# Patient Record
Sex: MALE | Race: WHITE | NOT HISPANIC OR LATINO | Employment: FULL TIME | URBAN - METROPOLITAN AREA
[De-identification: names, ages, dates, MRNs, and addresses within clinical notes are randomized per-mention and may not be internally consistent; named-entity substitution may affect disease eponyms.]

---

## 2023-02-07 PROBLEM — H91.93 HEARING LOSS, BILATERAL: Status: ACTIVE | Noted: 2023-02-07

## 2023-02-07 PROBLEM — D64.9 ANEMIA: Status: ACTIVE | Noted: 2023-02-07

## 2023-02-07 PROBLEM — M79.662 PAIN OF LEFT CALF: Status: ACTIVE | Noted: 2023-01-17

## 2023-02-07 PROBLEM — R07.89 CHEST HEAVINESS: Status: ACTIVE | Noted: 2023-02-07

## 2023-02-07 PROBLEM — R20.2 PARESTHESIA: Status: ACTIVE | Noted: 2023-02-07

## 2023-02-07 PROBLEM — H61.20 CERUMEN IMPACTION: Status: ACTIVE | Noted: 2023-02-07

## 2023-02-07 PROBLEM — R35.1 NOCTURIA: Status: ACTIVE | Noted: 2023-02-07

## 2023-02-07 PROBLEM — R97.20 ELEVATED PSA: Status: ACTIVE | Noted: 2023-02-07

## 2023-02-07 PROBLEM — R73.9 BORDERLINE HYPERGLYCEMIA: Status: ACTIVE | Noted: 2023-02-07

## 2023-02-07 PROBLEM — R31.9 HEMATURIA: Status: ACTIVE | Noted: 2023-02-07

## 2023-02-07 PROBLEM — E78.5 HYPERLIPIDEMIA: Status: ACTIVE | Noted: 2023-02-07

## 2023-02-07 PROBLEM — M51.34 BULGE OF THORACIC DISC WITHOUT MYELOPATHY: Status: ACTIVE | Noted: 2023-02-07

## 2023-02-07 PROBLEM — H60.91 RIGHT OTITIS EXTERNA: Status: ACTIVE | Noted: 2023-02-07

## 2023-02-07 PROBLEM — M54.9 BACK PAIN: Status: ACTIVE | Noted: 2023-02-07

## 2023-02-07 PROBLEM — M25.569 KNEE PAIN: Status: ACTIVE | Noted: 2023-01-18

## 2023-02-07 PROBLEM — R10.9 LEFT FLANK PAIN: Status: ACTIVE | Noted: 2023-02-07

## 2023-02-07 RX ORDER — NAPROXEN 500 MG/1
500 TABLET ORAL 2 TIMES DAILY
COMMUNITY
End: 2023-08-21 | Stop reason: ALTCHOICE

## 2023-02-16 PROBLEM — J20.9 ACUTE BRONCHITIS: Status: ACTIVE | Noted: 2023-02-16

## 2023-03-21 ENCOUNTER — TELEMEDICINE (OUTPATIENT)
Dept: PRIMARY CARE | Facility: CLINIC | Age: 73
End: 2023-03-21
Payer: MEDICARE

## 2023-03-21 DIAGNOSIS — H10.33 ACUTE CONJUNCTIVITIS OF BOTH EYES, UNSPECIFIED ACUTE CONJUNCTIVITIS TYPE: ICD-10-CM

## 2023-03-21 DIAGNOSIS — B34.2 CORONAVIRUS INFECTION: Primary | ICD-10-CM

## 2023-03-21 PROCEDURE — 99213 OFFICE O/P EST LOW 20 MIN: CPT | Performed by: FAMILY MEDICINE

## 2023-03-21 RX ORDER — TOBRAMYCIN 3 MG/ML
2 SOLUTION/ DROPS OPHTHALMIC EVERY 4 HOURS
Qty: 5 ML | Refills: 0 | Status: SHIPPED | OUTPATIENT
Start: 2023-03-21 | End: 2023-03-29

## 2023-03-21 RX ORDER — TOBRAMYCIN 3 MG/ML
2 SOLUTION/ DROPS OPHTHALMIC EVERY 4 HOURS
Qty: 8.4 ML | Refills: 0 | Status: SHIPPED | OUTPATIENT
Start: 2023-03-21 | End: 2023-03-21 | Stop reason: SDUPTHER

## 2023-03-21 NOTE — PROGRESS NOTES
Subjective   Patient ID: [id number]   Virtual or Telephone Consent    An interactive audio and video telecommunication system which permits real time communications between the patient (at the originating site) and provider (at the distant site) was utilized to provide this telehealth service.   Verbal consent was requested and obtained from Mal Lopez on this date, 03/21/23 for a telehealth visit.     Mal Lopez is a 72 y.o. male who presents for being covid positive and developing possible pink eye.  HPI  He tested positive for covid last night.  He first started feeling sick one to two weeks ago with what he thought was a sinus infection.  He has cough, nasal congestion and sinus drainage.      He has watering and irritation of the left eye.  Has a swollen conjunctiva in the left eye that spread to both eyes.  Both eyes were crusted shut this morning.    Objective     There were no vitals taken for this visit.     Physical Exam  Constitutional:       General: He is not in acute distress.     Appearance: He is not ill-appearing or toxic-appearing.   Eyes:      Comments: Swelling at both conjunctivae noted.  Tearing.   Pulmonary:      Effort: Pulmonary effort is normal. No respiratory distress.   Neurological:      Mental Status: He is alert.         Assessment/Plan   Problem List Items Addressed This Visit    None  Visit Diagnoses       Coronavirus infection    -  Primary    Acute conjunctivitis of both eyes, unspecified acute conjunctivitis type        Relevant Medications    tobramycin (Tobrex) 0.3 % ophthalmic solution        I recommend that you quarantine for 5 days and until you are feeling better.  I will call in tobramycin for your eyes.  I recommend mucinex for congestion and tylenol if you have a fever.  If you have significant shortness of breath, go to the ER.      Bhupendra Yee, DO

## 2023-03-21 NOTE — PATIENT INSTRUCTIONS
I recommend that you quarantine for 5 days and until you are feeling better.  I will call in tobramycin for your eyes.  I recommend mucinex for congestion and tylenol if you have a fever.  If you have significant shortness of breath, go to the ER.

## 2023-04-25 ENCOUNTER — APPOINTMENT (OUTPATIENT)
Dept: PRIMARY CARE | Facility: CLINIC | Age: 73
End: 2023-04-25
Payer: MEDICARE

## 2023-08-18 ENCOUNTER — OFFICE VISIT (OUTPATIENT)
Dept: PRIMARY CARE | Facility: CLINIC | Age: 73
End: 2023-08-18
Payer: MEDICARE

## 2023-08-18 VITALS
SYSTOLIC BLOOD PRESSURE: 112 MMHG | WEIGHT: 206 LBS | DIASTOLIC BLOOD PRESSURE: 70 MMHG | TEMPERATURE: 97.9 F | BODY MASS INDEX: 30.42 KG/M2

## 2023-08-18 DIAGNOSIS — M25.562 CHRONIC PAIN OF LEFT KNEE: ICD-10-CM

## 2023-08-18 DIAGNOSIS — M25.512 CHRONIC LEFT SHOULDER PAIN: Primary | ICD-10-CM

## 2023-08-18 DIAGNOSIS — G89.29 CHRONIC LEFT SHOULDER PAIN: Primary | ICD-10-CM

## 2023-08-18 DIAGNOSIS — G89.29 CHRONIC PAIN OF LEFT KNEE: ICD-10-CM

## 2023-08-18 DIAGNOSIS — H61.21 IMPACTED CERUMEN OF RIGHT EAR: ICD-10-CM

## 2023-08-18 PROCEDURE — 1036F TOBACCO NON-USER: CPT | Performed by: FAMILY MEDICINE

## 2023-08-18 PROCEDURE — 1159F MED LIST DOCD IN RCRD: CPT | Performed by: FAMILY MEDICINE

## 2023-08-18 PROCEDURE — 1160F RVW MEDS BY RX/DR IN RCRD: CPT | Performed by: FAMILY MEDICINE

## 2023-08-18 PROCEDURE — 99214 OFFICE O/P EST MOD 30 MIN: CPT | Performed by: FAMILY MEDICINE

## 2023-08-18 ASSESSMENT — PATIENT HEALTH QUESTIONNAIRE - PHQ9
1. LITTLE INTEREST OR PLEASURE IN DOING THINGS: NOT AT ALL
2. FEELING DOWN, DEPRESSED OR HOPELESS: NOT AT ALL
SUM OF ALL RESPONSES TO PHQ9 QUESTIONS 1 AND 2: 0

## 2023-08-18 ASSESSMENT — ENCOUNTER SYMPTOMS: DEPRESSION: 0

## 2023-08-18 NOTE — PROGRESS NOTES
Subjective   Patient ID: [id number]     Mal Lopez is a 73 y.o. male who presents for Shoulder Pain (Left), Knee Pain (Left), and Earache (Right).  HPI  He complains of left shoulder pain.  His left shoulder started hurting about five months ago.  It radiates down the left arm.  He has not noticed any neck pain.    He complains of left knee pain.  He was ordered PT when seen in February but he does not remember this and did not get PT.      The left knee pain has worsened over the last six months.    Neither of these pains started with any type of injury.    He takes an antiinflammatory medication and does not think that it helps.      He complains of right ear pain.  This has been going on for about a year, maybe less.  Wears hearing aids and he thinks that is a factor.  He states the hearing aids continue to help with his hearing.         Objective     /70 (BP Location: Left arm, Patient Position: Sitting)   Temp 36.6 °C (97.9 °F)   Wt 93.4 kg (206 lb)   BMI 30.42 kg/m²      Physical Exam  Constitutional:       Appearance: Normal appearance.   HENT:      Ears:      Comments: Hearing aids removed.  Right EAC completely blocked with light tan/yellow substance, possibly wax.        Musculoskeletal:      Comments: Bony hypertrophy left knee.  Mild effusion.  Ligs intact. DJD noted.      Left should tender with planes of ROM.  Abduction limited to 125 degrees and uses flexion to help.  Tender at subacromial space.    Neck nontender.  CROM normal.  Spurling's sign neg.   Neurological:      Mental Status: He is alert.       After irrigation of right ear--wax removed. EAC normal.  TM clear.  Assessment/Plan   Problem List Items Addressed This Visit       Cerumen impaction    Knee pain - Primary    Relevant Orders    XR shoulder left 2+ views    XR knee left 3 views    Referral to Physical Therapy     Other Visit Diagnoses       Chronic left shoulder pain        Relevant Orders    XR shoulder left 2+ views     XR knee left 3 views    Referral to Physical Therapy            Bhupendra Yee DO

## 2023-08-18 NOTE — PATIENT INSTRUCTIONS
I will order PT and xrays of the left knee and left shoulder.  Return in one month if the knee or shoulder still hurt. Your ear looks a lot better now.  Ear wax removal drops may be helpful.  Do not get water in the ears.  Do not use q tips.

## 2023-08-21 ENCOUNTER — OFFICE VISIT (OUTPATIENT)
Dept: PRIMARY CARE | Facility: CLINIC | Age: 73
End: 2023-08-21
Payer: MEDICARE

## 2023-08-21 VITALS
SYSTOLIC BLOOD PRESSURE: 122 MMHG | DIASTOLIC BLOOD PRESSURE: 74 MMHG | BODY MASS INDEX: 30.42 KG/M2 | TEMPERATURE: 98.4 F | WEIGHT: 206 LBS

## 2023-08-21 DIAGNOSIS — M25.511 ACUTE PAIN OF RIGHT SHOULDER: Primary | ICD-10-CM

## 2023-08-21 PROCEDURE — 1159F MED LIST DOCD IN RCRD: CPT | Performed by: FAMILY MEDICINE

## 2023-08-21 PROCEDURE — 99213 OFFICE O/P EST LOW 20 MIN: CPT | Performed by: FAMILY MEDICINE

## 2023-08-21 PROCEDURE — 1036F TOBACCO NON-USER: CPT | Performed by: FAMILY MEDICINE

## 2023-08-21 PROCEDURE — 1160F RVW MEDS BY RX/DR IN RCRD: CPT | Performed by: FAMILY MEDICINE

## 2023-08-21 RX ORDER — NAPROXEN 500 MG/1
500 TABLET ORAL 2 TIMES DAILY
Qty: 60 TABLET | Refills: 0 | Status: SHIPPED
Start: 2023-08-21 | End: 2023-09-28

## 2023-08-21 RX ORDER — CELECOXIB 200 MG/1
200 CAPSULE ORAL 2 TIMES DAILY
COMMUNITY
End: 2023-08-21 | Stop reason: ALTCHOICE

## 2023-08-21 ASSESSMENT — PATIENT HEALTH QUESTIONNAIRE - PHQ9
SUM OF ALL RESPONSES TO PHQ9 QUESTIONS 1 AND 2: 0
2. FEELING DOWN, DEPRESSED OR HOPELESS: NOT AT ALL
1. LITTLE INTEREST OR PLEASURE IN DOING THINGS: NOT AT ALL

## 2023-08-21 ASSESSMENT — ENCOUNTER SYMPTOMS: DEPRESSION: 0

## 2023-08-21 NOTE — PATIENT INSTRUCTIONS
I will add on an xray of your right shoulder.  I will refer you to an orthopedic doctor regarding your right shoulder.

## 2023-08-21 NOTE — PROGRESS NOTES
Subjective   Patient ID: [id number]     Mal Lopez is a 73 y.o. male who presents for Shoulder Injury (Right shoulder.  Unable to raise arm.).  HPI  He complains of right shoulder pain and injury.  He is unable to raise his arm.      He was repairing a cistern in his older home in the basement.  He went down to get a tool that he dropped and he used his arms to push himself up.  His left arm slipped and he fell on his right shoulder.    It is still feeling bad.  He cannot raise the right shoulder.    He was seen Friday, complaining of left shoulder pain.  This is a new injury to the right shoulder.        Objective     /74 (BP Location: Left arm, Patient Position: Sitting)   Temp 36.9 °C (98.4 °F)   Wt 93.4 kg (206 lb)   BMI 30.42 kg/m²      Physical Exam  Constitutional:       Appearance: Normal appearance.   Musculoskeletal:      Comments: Right shoulder very little motion.  Abduction 20.  Flexion 20.  Tender at the subacromial space.  External rotation 30.         Neurological:      Mental Status: He is alert.         Assessment/Plan   Problem List Items Addressed This Visit    None  Visit Diagnoses       Acute pain of right shoulder    -  Primary    Relevant Medications    naproxen (Naprosyn) 500 mg tablet    Other Relevant Orders    XR shoulder right 2+ views    Referral to Orthopaedic Surgery        I will add on an xray of your right shoulder.  I will refer you to an orthopedic doctor regarding your right shoulder.      Bhupendra Yee,

## 2023-09-26 ENCOUNTER — TELEPHONE (OUTPATIENT)
Dept: PRIMARY CARE | Facility: CLINIC | Age: 73
End: 2023-09-26
Payer: MEDICARE

## 2023-09-26 NOTE — TELEPHONE ENCOUNTER
Bhupendra Yee, DO to [place] [id number] Clinical Support Staff    Please let him know his xrays of the shoulder and knee showed arthritis.  It is considered to be severe in the left knee.  No other significant abnormalities were seen.

## 2023-09-26 NOTE — TELEPHONE ENCOUNTER
Results discussed with patient.  He states that left knee is very swollen and painful.  The pain is keeping him up at night.  Patient would like to speak with you to discuss treatment options/recommendations to ease pain.  Please call [phone]

## 2023-09-28 DIAGNOSIS — M17.10 PRIMARY OSTEOARTHRITIS OF KNEE, UNSPECIFIED LATERALITY: Primary | ICD-10-CM

## 2023-09-28 RX ORDER — CELECOXIB 200 MG/1
200 CAPSULE ORAL DAILY
Qty: 30 CAPSULE | Refills: 0 | Status: SHIPPED | OUTPATIENT
Start: 2023-09-28 | End: 2024-03-26

## 2023-09-28 NOTE — TELEPHONE ENCOUNTER
Spoke with pt.  He has severe pain and swelling in the knee.    He has a rotator cuff tear.  He needs  a surgery for this.      A knee replacement may be needed.  Recommend ortho referral for the knee once recovered from the shoulder surgery.

## 2023-10-05 ENCOUNTER — OFFICE VISIT (OUTPATIENT)
Dept: PRIMARY CARE | Facility: CLINIC | Age: 73
End: 2023-10-05
Payer: MEDICARE

## 2023-10-05 VITALS
WEIGHT: 209 LBS | BODY MASS INDEX: 30.86 KG/M2 | DIASTOLIC BLOOD PRESSURE: 70 MMHG | SYSTOLIC BLOOD PRESSURE: 114 MMHG | TEMPERATURE: 97.3 F

## 2023-10-05 DIAGNOSIS — H65.191 ACUTE MUCOID OTITIS MEDIA OF RIGHT EAR: Primary | ICD-10-CM

## 2023-10-05 PROCEDURE — 1160F RVW MEDS BY RX/DR IN RCRD: CPT | Performed by: FAMILY MEDICINE

## 2023-10-05 PROCEDURE — 99213 OFFICE O/P EST LOW 20 MIN: CPT | Performed by: FAMILY MEDICINE

## 2023-10-05 PROCEDURE — 1036F TOBACCO NON-USER: CPT | Performed by: FAMILY MEDICINE

## 2023-10-05 PROCEDURE — 1159F MED LIST DOCD IN RCRD: CPT | Performed by: FAMILY MEDICINE

## 2023-10-05 RX ORDER — NEOMYCIN SULFATE, POLYMYXIN B SULFATE AND HYDROCORTISONE 10; 3.5; 1 MG/ML; MG/ML; [USP'U]/ML
3 SUSPENSION/ DROPS AURICULAR (OTIC) 4 TIMES DAILY
Qty: 10 ML | Refills: 0 | Status: SHIPPED | OUTPATIENT
Start: 2023-10-05 | End: 2023-10-22

## 2023-10-05 RX ORDER — DOXYCYCLINE 100 MG/1
100 CAPSULE ORAL 2 TIMES DAILY
Qty: 20 CAPSULE | Refills: 0 | Status: SHIPPED | OUTPATIENT
Start: 2023-10-05 | End: 2023-10-15

## 2023-10-05 ASSESSMENT — PATIENT HEALTH QUESTIONNAIRE - PHQ9
2. FEELING DOWN, DEPRESSED OR HOPELESS: NOT AT ALL
SUM OF ALL RESPONSES TO PHQ9 QUESTIONS 1 AND 2: 0
1. LITTLE INTEREST OR PLEASURE IN DOING THINGS: NOT AT ALL

## 2023-10-05 ASSESSMENT — ENCOUNTER SYMPTOMS: DEPRESSION: 0

## 2023-10-05 NOTE — PROGRESS NOTES
Subjective   Patient ID: [id number]     Mal Lopez is a 73 y.o. male who presents for ear blockage.  HPI  He complains of a blocked sensation in his right ear. This started a few days after his last appointment.    No headache.  Chronic neck pain.        Objective     /70 (BP Location: Left arm, Patient Position: Sitting)   Temp 36.3 °C (97.3 °F)   Wt 94.8 kg (209 lb)   BMI 30.86 kg/m²      Physical Exam  Constitutional:       Appearance: Normal appearance.   HENT:      Right Ear: There is no impacted cerumen.      Ears:      Comments: TM appears hazy/yellow/green.  EAC nontender.  Scant drainage. I do not see a wax impaction.    Neurological:      General: No focal deficit present.      Mental Status: He is alert.         Assessment/Plan   Problem List Items Addressed This Visit    None  Visit Diagnoses       Acute mucoid otitis media of right ear    -  Primary    Relevant Medications    doxycycline (Vibramycin) 100 mg capsule    neomycin-polymyxin-HC (Cortisporin) 3.5-10,000-1 mg/mL-unit/mL-% otic suspension    Other Relevant Orders    Referral to ENT          I do not think that this is a wax impaction.  The tympanic membrane looks unusual to me.  I will start you on antibiotics and refer you to an ENT specialist.    Bhupendra Yee, DO

## 2023-10-05 NOTE — PATIENT INSTRUCTIONS
I do not think that this is a wax impaction.  The tympanic membrane looks unusual to me.  I will start you on antibiotics and refer you to an ENT specialist.

## 2023-10-26 ENCOUNTER — OFFICE VISIT (OUTPATIENT)
Dept: ORTHOPEDIC SURGERY | Facility: CLINIC | Age: 73
End: 2023-10-26
Payer: MEDICARE

## 2023-10-26 DIAGNOSIS — M75.101 ROTATOR CUFF TEAR, NON-TRAUMATIC, RIGHT: Primary | ICD-10-CM

## 2023-10-26 PROCEDURE — 99214 OFFICE O/P EST MOD 30 MIN: CPT | Performed by: ORTHOPAEDIC SURGERY

## 2023-10-26 PROCEDURE — 99214 OFFICE O/P EST MOD 30 MIN: CPT | Mod: 57 | Performed by: ORTHOPAEDIC SURGERY

## 2023-10-26 PROCEDURE — 1160F RVW MEDS BY RX/DR IN RCRD: CPT | Performed by: ORTHOPAEDIC SURGERY

## 2023-10-26 PROCEDURE — 1036F TOBACCO NON-USER: CPT | Performed by: ORTHOPAEDIC SURGERY

## 2023-10-26 PROCEDURE — 1159F MED LIST DOCD IN RCRD: CPT | Performed by: ORTHOPAEDIC SURGERY

## 2023-10-26 NOTE — PROGRESS NOTES
History of Present Illness   Chief Complaint   Patient presents with    Right Shoulder - Follow-up     Rt shoulder s/p inj 23       Patient returns today with side: right shoulder pain.  The patient is had a corticosteroid injection a month ago.  This helped but his pain has returned.  The pain is sharp in nature, localizes lateral and deep.  Better with rest.    Past Medical History:   Diagnosis Date    Allergic rhinitis due to pollen     Hay fever    Personal history of other diseases of the respiratory system     Personal history of asthma       Medication Documentation Review Audit       Reviewed by Faby Durand MA (Medical Assistant) on 10/26/23 at 0914      Medication Order Taking? Sig Documenting Provider Last Dose Status   celecoxib (CeleBREX) 200 mg capsule [id number]  Take 1 capsule (200 mg) by mouth once daily. Bhupendra Yee DO  Active   neomycin-polymyxin-HC (Cortisporin) 3.5-10,000-1 mg/mL-unit/mL-% otic suspension [id number]  Administer 3 drops into the right ear 4 times a day for 17 days. Bhupendra Yee DO   10/22/23 7178                    Allergies   Allergen Reactions    House Dust Unknown    Penicillins Unknown       Social History     Socioeconomic History    Marital status:      Spouse name: Not on file    Number of children: Not on file    Years of education: Not on file    Highest education level: Not on file   Occupational History    Not on file   Tobacco Use    Smoking status: Never    Smokeless tobacco: Never   Substance and Sexual Activity    Alcohol use: Not on file    Drug use: Not on file    Sexual activity: Not on file   Other Topics Concern    Not on file   Social History Narrative    Not on file     Social Determinants of Health     Financial Resource Strain: Not on file   Food Insecurity: Not on file   Transportation Needs: Not on file   Physical Activity: Not on file   Stress: Not on file   Social Connections: Not on file   Intimate Partner  Violence: Not on file   Housing Stability: Not on file       History reviewed. No pertinent surgical history.       Review of Systems   GENERAL: Negative for malaise, significant weight loss, fever  MUSCULOSKELETAL: see HPI  NEURO:  Negative     Physical Exam  This is a male in no acute distress  Neck is supple nontender, Spurling's test is negative  side: right Shoulder:  There is  no  tenderness to palpation over the acromioclavicular joint.  Active range of motion: Forward elevation 170, internal rotation L5,, external rotation 80 degrees  Jobes test positive  External rotation test positive  Belly press test negative  [place]'s test positive  Elbow and wrist motion were not irritable.  Radial pulse 2+ and palpable.     Imaging:  XR shoulder  Narrative: Interpreted By:  MADIHA VILLARREAL MD  MRN: [id number]  Patient Name: DARWIN AMARAL     STUDY:  SHOULDER, CMPLT, MIN 2 VIEWS;  9/25/2023 11:36 am     INDICATION:  left shoulder pain.     COMPARISON:  None     ACCESSION NUMBER(S):  [id number]     ORDERING CLINICIAN:  JACQUE DHILLON     FINDINGS:  AP, scapular Y, and axillary radiographs of the  left shoulder  provided     Mild productive degenerative changes of the glenohumeral joint.     moderate productive degenerative changes of the acromioclavicular  joint.     The visualized soft tissues appear unremarkable.     No fracture or dislocation. No osseous lesion.     Impression: Mild productive degenerative changes of the glenohumeral joint.  Moderate productive degenerative changes of the acromioclavicular  joint.     XR knee complete 4 or more views  Narrative: Interpreted By:  MADIHA VILLARREAL MD  MRN: [id number]  Patient Name: DARWIN AMARAL     STUDY:  KNEE; COMPLT, 4 OR MORE VIEWS;  9/25/2023 11:36 am     INDICATION:  left knee pain.     COMPARISON:  01/18/2023 left knee radiograph     ACCESSION NUMBER(S):  [id number]     ORDERING CLINICIAN:  JACQUE DHILLON     FINDINGS:  Moderate to severe tricompartment  productive degenerative changes.  Degenerative changes predominantly involving the medial compartment.  No fracture or dislocation. No osseous lesion. No effusion. Moderate  posterior arterial calcifications.     Impression: Moderate to severe mainly medial compartment left knee osteoarthrosis.            Assessment:  Patient with side: right shoulder  rotator cuff tear, medial biceps subluxation and impingement     Plan:  We had a lengthy discussion regarding rotator cuff tears.  We discussed non-operative treatment and concern for atrophy, weakness and possible progression to cuff arthropathy.    We discussed surgical intervention and rotator cuff repair, including associated interventions at the time of surgery.       The risk of failure to heal/re-rupture was reviewed and the role of the size of the tear and timing of intervention in regards to the outcome.     Shoulder arthroscopy was discussed including the risks (stiffness, infection, medical complication, etc.) and timeframe for recovery.       We discussed the importance of formal physical therapy and strict adherence to a home exercise program.    If the patient would like to proceed, we will obtain appropriate pre-operative testing.    The patient wishes to proceed.  We will schedule and then as an outpatient in the near future.  Questions answered

## 2023-11-08 ENCOUNTER — TRANSCRIBE ORDERS (OUTPATIENT)
Dept: OPERATING ROOM | Facility: HOSPITAL | Age: 73
End: 2023-11-08
Payer: MEDICARE

## 2023-11-08 ENCOUNTER — TELEPHONE (OUTPATIENT)
Dept: ORTHOPEDIC SURGERY | Facility: CLINIC | Age: 73
End: 2023-11-08
Payer: MEDICARE

## 2023-11-08 DIAGNOSIS — M24.29 DISORDER OF LIGAMENT, OTHER SPECIFIED SITE: ICD-10-CM

## 2023-11-08 DIAGNOSIS — M75.121 COMPLETE ROTATOR CUFF TEAR OR RUPTURE OF RIGHT SHOULDER, NOT SPECIFIED AS TRAUMATIC: Primary | ICD-10-CM

## 2023-11-08 DIAGNOSIS — M23.061 CYSTIC MENISCUS, OTHER LATERAL MENISCUS, RIGHT KNEE: ICD-10-CM

## 2023-11-13 PROBLEM — M75.121 COMPLETE ROTATOR CUFF TEAR OR RUPTURE OF RIGHT SHOULDER, NOT SPECIFIED AS TRAUMATIC: Status: ACTIVE | Noted: 2023-11-08

## 2023-11-16 ENCOUNTER — CLINICAL SUPPORT (OUTPATIENT)
Dept: AUDIOLOGY | Facility: CLINIC | Age: 73
End: 2023-11-16
Payer: MEDICARE

## 2023-11-16 ENCOUNTER — OFFICE VISIT (OUTPATIENT)
Dept: OTOLARYNGOLOGY | Facility: CLINIC | Age: 73
End: 2023-11-16
Payer: MEDICARE

## 2023-11-16 VITALS — WEIGHT: 209 LBS | BODY MASS INDEX: 30.96 KG/M2 | HEIGHT: 69 IN

## 2023-11-16 DIAGNOSIS — H65.191 ACUTE MUCOID OTITIS MEDIA OF RIGHT EAR: ICD-10-CM

## 2023-11-16 DIAGNOSIS — H90.3 ASYMMETRICAL SENSORINEURAL HEARING LOSS: Primary | ICD-10-CM

## 2023-11-16 DIAGNOSIS — H93.13 TINNITUS OF BOTH EARS: ICD-10-CM

## 2023-11-16 DIAGNOSIS — H90.3 ASYMMETRIC SNHL (SENSORINEURAL HEARING LOSS): Primary | ICD-10-CM

## 2023-11-16 PROCEDURE — 1160F RVW MEDS BY RX/DR IN RCRD: CPT | Performed by: PHYSICIAN ASSISTANT

## 2023-11-16 PROCEDURE — 99203 OFFICE O/P NEW LOW 30 MIN: CPT | Performed by: PHYSICIAN ASSISTANT

## 2023-11-16 PROCEDURE — 69210 REMOVE IMPACTED EAR WAX UNI: CPT | Performed by: PHYSICIAN ASSISTANT

## 2023-11-16 PROCEDURE — 1159F MED LIST DOCD IN RCRD: CPT | Performed by: PHYSICIAN ASSISTANT

## 2023-11-16 PROCEDURE — 92567 TYMPANOMETRY: CPT | Performed by: AUDIOLOGIST

## 2023-11-16 PROCEDURE — 92557 COMPREHENSIVE HEARING TEST: CPT | Performed by: AUDIOLOGIST

## 2023-11-16 PROCEDURE — 1036F TOBACCO NON-USER: CPT | Performed by: PHYSICIAN ASSISTANT

## 2023-11-16 NOTE — PROGRESS NOTES
Mal Lopez is a 73 y.o. year old male patient with Otitis Media     Patient presents to the office today for assessment of ears and hearing.  The patient has a known history of hearing loss.  He does have hearing aids from [employer].  The patient is here today after he was seen by his primary care physician and there was concern for possible otitis media.  He is here today for further assessment.  He is without other ENT related concerns.      Review of Systems   All other systems reviewed and are negative.        Physical Exam:   General appearance: No acute distress. Normal facies. Symmetric facial movement. No gross lesions of the face are noted.  The external ear structures appear normal.  Patient with bilateral cerumen impactions removed today with curette and suction.  Following removal the ear canals patent and the tympanic membranes are intact without evidence of air-fluid levels, retraction, or congenital defects.  Anterior rhinoscopy notes essentially a midline nasal septum. Examination is noted for normal healthy mucosal membranes without any evidence of lesions, polyps, or exudate. The tongue is normally mobile. There are no lesions on the gingiva, buccal, or oral mucosa. There are no oral cavity masses.  The neck is negative for mass lymphadenopathy. The trachea and parotid are clear. The thyroid bed is grossly unremarkable. The salivary gland structures are grossly unremarkable.    Procedure:  Patient with bilateral cerumen impaction removed today with suction and curette.    Audiogram:  Audiogram demonstrates bilateral sensorineural hearing loss which is asymmetric.  Right ear with a mild to profound sloping loss in the left ear has a moderate to severe sloping loss.  Word discrimination is 88% in the right ear and 48% left.    Assessment/Plan   1.  Asymmetric sensorineural hearing loss   2.  Cerumen impaction    Patient seen in the office today for assessment of ears.  Patient with cerumen  impactions now status post removal.  Patient does have asymmetric hearing loss left greater than right.  The patient is going to try to obtain old hearing records for comparison he will bring them to our office and we will determine the next step for the patient with this asymmetry.

## 2023-11-16 NOTE — PROGRESS NOTES
Mr. Lopez, age 73, was seen today for a hearing evaluation during his ENT visit with Dr. Smith's physician's assistant, Alexander Doherty.  He has a history of known hearing loss and wears binaural RUFINA style hearing aids from [employer].  Hearing evaluation was completed today following ear cleaning.  He also reported tinnitus.    Results:  Otoscopy revealed clear ear canals and tympanic membranes were visualized bilaterally.  Tympanometry revealed a Type A/C tympanogram in his right ear, indicating normal ear canal volume and compliance with borderline significant negative peak pressure and a Type A sub d tympanogram in his left ear, indicating normal ear canal volume and peak pressure with increased compliance/mobility.  Audiometric thresholds revealed a mild sloping to profound sensorineural hearing loss in his right ear and a moderate to profound sensorineural hearing loss in his left ear.  Word recognition scores were good at 88% in his right ear and poor at 48% in his left ear.    Recommendations:  Follow-up with PCP, Dr. Yee, as medically directed.  Follow-up with ENT, as medically directed.  Patient was recommended to bring to the office previous hearing evaluations to review for comparison due to patient's asymmetric hearing loss.  Continue use of amplification.  Retest hearing annually to monitor.

## 2023-11-27 ENCOUNTER — HOSPITAL ENCOUNTER (OUTPATIENT)
Dept: CARDIOLOGY | Facility: HOSPITAL | Age: 73
Discharge: HOME | End: 2023-11-27
Payer: MEDICARE

## 2023-11-27 ENCOUNTER — APPOINTMENT (OUTPATIENT)
Dept: PREADMISSION TESTING | Facility: HOSPITAL | Age: 73
End: 2023-11-27
Payer: MEDICARE

## 2023-11-27 DIAGNOSIS — M75.121 COMPLETE ROTATOR CUFF TEAR OR RUPTURE OF RIGHT SHOULDER, NOT SPECIFIED AS TRAUMATIC: ICD-10-CM

## 2023-11-27 PROCEDURE — 93005 ELECTROCARDIOGRAM TRACING: CPT

## 2023-11-27 PROCEDURE — 93010 ELECTROCARDIOGRAM REPORT: CPT | Performed by: INTERNAL MEDICINE

## 2023-11-29 LAB
ATRIAL RATE: 69 BPM
P AXIS: 58 DEGREES
P OFFSET: 204 MS
P ONSET: 140 MS
PR INTERVAL: 168 MS
Q ONSET: 224 MS
QRS COUNT: 11 BEATS
QRS DURATION: 90 MS
QT INTERVAL: 410 MS
QTC CALCULATION(BAZETT): 439 MS
QTC FREDERICIA: 429 MS
R AXIS: -4 DEGREES
T AXIS: 59 DEGREES
T OFFSET: 429 MS
VENTRICULAR RATE: 69 BPM

## 2023-12-05 ENCOUNTER — ANESTHESIA EVENT (OUTPATIENT)
Dept: OPERATING ROOM | Facility: HOSPITAL | Age: 73
End: 2023-12-05
Payer: MEDICARE

## 2023-12-06 ENCOUNTER — HOSPITAL ENCOUNTER (OUTPATIENT)
Facility: HOSPITAL | Age: 73
Setting detail: OUTPATIENT SURGERY
Discharge: HOME | End: 2023-12-06
Attending: ORTHOPAEDIC SURGERY | Admitting: ORTHOPAEDIC SURGERY
Payer: MEDICARE

## 2023-12-06 ENCOUNTER — PREP FOR PROCEDURE (OUTPATIENT)
Dept: OPERATING ROOM | Facility: HOSPITAL | Age: 73
End: 2023-12-06

## 2023-12-06 ENCOUNTER — ANESTHESIA (OUTPATIENT)
Dept: OPERATING ROOM | Facility: HOSPITAL | Age: 73
End: 2023-12-06
Payer: MEDICARE

## 2023-12-06 VITALS
OXYGEN SATURATION: 96 % | SYSTOLIC BLOOD PRESSURE: 135 MMHG | HEIGHT: 71 IN | HEART RATE: 77 BPM | DIASTOLIC BLOOD PRESSURE: 72 MMHG | BODY MASS INDEX: 28.18 KG/M2 | TEMPERATURE: 97.5 F | RESPIRATION RATE: 18 BRPM | WEIGHT: 201.28 LBS

## 2023-12-06 DIAGNOSIS — M75.121 COMPLETE TEAR OF RIGHT ROTATOR CUFF, UNSPECIFIED WHETHER TRAUMATIC: Primary | ICD-10-CM

## 2023-12-06 PROBLEM — M65.811 SYNOVITIS OF RIGHT SHOULDER: Status: ACTIVE | Noted: 2023-12-06

## 2023-12-06 PROBLEM — M65.911 SYNOVITIS OF RIGHT SHOULDER: Status: ACTIVE | Noted: 2023-12-06

## 2023-12-06 PROBLEM — M75.41 SHOULDER IMPINGEMENT SYNDROME, RIGHT: Status: ACTIVE | Noted: 2023-12-06

## 2023-12-06 PROBLEM — S46.101D INJURY OF TENDON OF LONG HEAD OF BICEPS, RIGHT, SUBSEQUENT ENCOUNTER: Status: ACTIVE | Noted: 2023-12-06

## 2023-12-06 PROBLEM — M65.9 SYNOVITIS OF RIGHT SHOULDER: Status: ACTIVE | Noted: 2023-12-06

## 2023-12-06 PROCEDURE — 2500000004 HC RX 250 GENERAL PHARMACY W/ HCPCS (ALT 636 FOR OP/ED)

## 2023-12-06 PROCEDURE — 7100000001 HC RECOVERY ROOM TIME - INITIAL BASE CHARGE: Performed by: ORTHOPAEDIC SURGERY

## 2023-12-06 PROCEDURE — 2720000007 HC OR 272 NO HCPCS: Performed by: ORTHOPAEDIC SURGERY

## 2023-12-06 PROCEDURE — 3700000002 HC GENERAL ANESTHESIA TIME - EACH INCREMENTAL 1 MINUTE: Performed by: ORTHOPAEDIC SURGERY

## 2023-12-06 PROCEDURE — 3600000009 HC OR TIME - EACH INCREMENTAL 1 MINUTE - PROCEDURE LEVEL FOUR: Performed by: ORTHOPAEDIC SURGERY

## 2023-12-06 PROCEDURE — 2500000004 HC RX 250 GENERAL PHARMACY W/ HCPCS (ALT 636 FOR OP/ED): Performed by: STUDENT IN AN ORGANIZED HEALTH CARE EDUCATION/TRAINING PROGRAM

## 2023-12-06 PROCEDURE — 7100000009 HC PHASE TWO TIME - INITIAL BASE CHARGE: Performed by: ORTHOPAEDIC SURGERY

## 2023-12-06 PROCEDURE — 2500000005 HC RX 250 GENERAL PHARMACY W/O HCPCS

## 2023-12-06 PROCEDURE — A4217 STERILE WATER/SALINE, 500 ML: HCPCS | Performed by: ORTHOPAEDIC SURGERY

## 2023-12-06 PROCEDURE — C1713 ANCHOR/SCREW BN/BN,TIS/BN: HCPCS | Performed by: ORTHOPAEDIC SURGERY

## 2023-12-06 PROCEDURE — 29827 SHO ARTHRS SRG RT8TR CUF RPR: CPT | Performed by: ORTHOPAEDIC SURGERY

## 2023-12-06 PROCEDURE — 2780000003 HC OR 278 NO HCPCS: Performed by: ORTHOPAEDIC SURGERY

## 2023-12-06 PROCEDURE — 29826 SHO ARTHRS SRG DECOMPRESSION: CPT | Performed by: ORTHOPAEDIC SURGERY

## 2023-12-06 PROCEDURE — 3600000004 HC OR TIME - INITIAL BASE CHARGE - PROCEDURE LEVEL FOUR: Performed by: ORTHOPAEDIC SURGERY

## 2023-12-06 PROCEDURE — 23430 REPAIR BICEPS TENDON: CPT | Performed by: ORTHOPAEDIC SURGERY

## 2023-12-06 PROCEDURE — 23430 REPAIR BICEPS TENDON: CPT | Performed by: PHYSICIAN ASSISTANT

## 2023-12-06 PROCEDURE — 29826 SHO ARTHRS SRG DECOMPRESSION: CPT | Performed by: PHYSICIAN ASSISTANT

## 2023-12-06 PROCEDURE — 2500000004 HC RX 250 GENERAL PHARMACY W/ HCPCS (ALT 636 FOR OP/ED): Performed by: ORTHOPAEDIC SURGERY

## 2023-12-06 PROCEDURE — 7100000010 HC PHASE TWO TIME - EACH INCREMENTAL 1 MINUTE: Performed by: ORTHOPAEDIC SURGERY

## 2023-12-06 PROCEDURE — 7100000002 HC RECOVERY ROOM TIME - EACH INCREMENTAL 1 MINUTE: Performed by: ORTHOPAEDIC SURGERY

## 2023-12-06 PROCEDURE — 29827 SHO ARTHRS SRG RT8TR CUF RPR: CPT | Performed by: PHYSICIAN ASSISTANT

## 2023-12-06 PROCEDURE — 3700000001 HC GENERAL ANESTHESIA TIME - INITIAL BASE CHARGE: Performed by: ORTHOPAEDIC SURGERY

## 2023-12-06 PROCEDURE — 2500000004 HC RX 250 GENERAL PHARMACY W/ HCPCS (ALT 636 FOR OP/ED): Performed by: PHYSICIAN ASSISTANT

## 2023-12-06 DEVICE — ANCHOR, BIOCOMPOSITE SWIVELOCK 4.75 X 19.1: Type: IMPLANTABLE DEVICE | Site: SHOULDER | Status: FUNCTIONAL

## 2023-12-06 DEVICE — SP FBRTAK RC FBRTPE BLU & STTPE WH/BLK
Type: IMPLANTABLE DEVICE | Site: SHOULDER | Status: FUNCTIONAL
Brand: ARTHREX®

## 2023-12-06 DEVICE — SUTURE, 2.6 FIBERTAK RC SP, 1.7 TIGERTAPE LOOP, WH/BLK: Type: IMPLANTABLE DEVICE | Site: SHOULDER | Status: FUNCTIONAL

## 2023-12-06 RX ORDER — DEXAMETHASONE SODIUM PHOSPHATE 4 MG/ML
INJECTION, SOLUTION INTRA-ARTICULAR; INTRALESIONAL; INTRAMUSCULAR; INTRAVENOUS; SOFT TISSUE AS NEEDED
Status: DISCONTINUED | OUTPATIENT
Start: 2023-12-06 | End: 2023-12-06

## 2023-12-06 RX ORDER — ONDANSETRON HYDROCHLORIDE 2 MG/ML
4 INJECTION, SOLUTION INTRAVENOUS ONCE AS NEEDED
Status: DISCONTINUED | OUTPATIENT
Start: 2023-12-06 | End: 2023-12-06 | Stop reason: HOSPADM

## 2023-12-06 RX ORDER — FENTANYL CITRATE 50 UG/ML
INJECTION, SOLUTION INTRAMUSCULAR; INTRAVENOUS AS NEEDED
Status: DISCONTINUED | OUTPATIENT
Start: 2023-12-06 | End: 2023-12-06

## 2023-12-06 RX ORDER — ONDANSETRON HYDROCHLORIDE 2 MG/ML
INJECTION, SOLUTION INTRAVENOUS AS NEEDED
Status: DISCONTINUED | OUTPATIENT
Start: 2023-12-06 | End: 2023-12-06

## 2023-12-06 RX ORDER — CEFAZOLIN SODIUM 2 G/100ML
2 INJECTION, SOLUTION INTRAVENOUS ONCE
Status: COMPLETED | OUTPATIENT
Start: 2023-12-06 | End: 2023-12-06

## 2023-12-06 RX ORDER — FENTANYL CITRATE 50 UG/ML
25 INJECTION, SOLUTION INTRAMUSCULAR; INTRAVENOUS EVERY 5 MIN PRN
Status: DISCONTINUED | OUTPATIENT
Start: 2023-12-06 | End: 2023-12-06 | Stop reason: HOSPADM

## 2023-12-06 RX ORDER — OXYCODONE HYDROCHLORIDE 5 MG/1
5 TABLET ORAL EVERY 4 HOURS PRN
Status: DISCONTINUED | OUTPATIENT
Start: 2023-12-06 | End: 2023-12-06 | Stop reason: HOSPADM

## 2023-12-06 RX ORDER — SODIUM CHLORIDE 0.9 G/100ML
IRRIGANT IRRIGATION AS NEEDED
Status: DISCONTINUED | OUTPATIENT
Start: 2023-12-06 | End: 2023-12-06 | Stop reason: HOSPADM

## 2023-12-06 RX ORDER — EPINEPHRINE 1 MG/ML
INJECTION, SOLUTION, CONCENTRATE INTRAVENOUS AS NEEDED
Status: DISCONTINUED | OUTPATIENT
Start: 2023-12-06 | End: 2023-12-06 | Stop reason: HOSPADM

## 2023-12-06 RX ORDER — DIPHENHYDRAMINE HYDROCHLORIDE 50 MG/ML
INJECTION INTRAMUSCULAR; INTRAVENOUS AS NEEDED
Status: DISCONTINUED | OUTPATIENT
Start: 2023-12-06 | End: 2023-12-06

## 2023-12-06 RX ORDER — LABETALOL HYDROCHLORIDE 5 MG/ML
5 INJECTION, SOLUTION INTRAVENOUS ONCE AS NEEDED
Status: DISCONTINUED | OUTPATIENT
Start: 2023-12-06 | End: 2023-12-06 | Stop reason: HOSPADM

## 2023-12-06 RX ORDER — PROPOFOL 10 MG/ML
INJECTION, EMULSION INTRAVENOUS AS NEEDED
Status: DISCONTINUED | OUTPATIENT
Start: 2023-12-06 | End: 2023-12-06

## 2023-12-06 RX ORDER — SODIUM CHLORIDE, SODIUM LACTATE, POTASSIUM CHLORIDE, CALCIUM CHLORIDE 600; 310; 30; 20 MG/100ML; MG/100ML; MG/100ML; MG/100ML
50 INJECTION, SOLUTION INTRAVENOUS CONTINUOUS
Status: DISCONTINUED | OUTPATIENT
Start: 2023-12-06 | End: 2023-12-06 | Stop reason: HOSPADM

## 2023-12-06 RX ORDER — LIDOCAINE HYDROCHLORIDE 20 MG/ML
INJECTION, SOLUTION INFILTRATION; PERINEURAL AS NEEDED
Status: DISCONTINUED | OUTPATIENT
Start: 2023-12-06 | End: 2023-12-06

## 2023-12-06 RX ORDER — SODIUM CHLORIDE, SODIUM LACTATE, POTASSIUM CHLORIDE, CALCIUM CHLORIDE 600; 310; 30; 20 MG/100ML; MG/100ML; MG/100ML; MG/100ML
100 INJECTION, SOLUTION INTRAVENOUS CONTINUOUS
Status: DISCONTINUED | OUTPATIENT
Start: 2023-12-06 | End: 2023-12-06 | Stop reason: HOSPADM

## 2023-12-06 RX ORDER — FAMOTIDINE 10 MG/ML
INJECTION INTRAVENOUS AS NEEDED
Status: DISCONTINUED | OUTPATIENT
Start: 2023-12-06 | End: 2023-12-06

## 2023-12-06 RX ORDER — OXYCODONE HYDROCHLORIDE 5 MG/1
5 TABLET ORAL EVERY 6 HOURS PRN
Qty: 28 TABLET | Refills: 0 | Status: SHIPPED | OUTPATIENT
Start: 2023-12-06 | End: 2023-12-13

## 2023-12-06 RX ORDER — MIDAZOLAM HYDROCHLORIDE 1 MG/ML
INJECTION, SOLUTION INTRAMUSCULAR; INTRAVENOUS AS NEEDED
Status: DISCONTINUED | OUTPATIENT
Start: 2023-12-06 | End: 2023-12-06

## 2023-12-06 RX ORDER — ROCURONIUM BROMIDE 10 MG/ML
INJECTION, SOLUTION INTRAVENOUS AS NEEDED
Status: DISCONTINUED | OUTPATIENT
Start: 2023-12-06 | End: 2023-12-06

## 2023-12-06 RX ADMIN — FENTANYL CITRATE 50 MCG: 50 INJECTION, SOLUTION INTRAMUSCULAR; INTRAVENOUS at 10:04

## 2023-12-06 RX ADMIN — SUGAMMADEX 200 MG: 100 INJECTION, SOLUTION INTRAVENOUS at 11:22

## 2023-12-06 RX ADMIN — ROCURONIUM BROMIDE 10 MG: 10 SOLUTION INTRAVENOUS at 10:50

## 2023-12-06 RX ADMIN — DIPHENHYDRAMINE HYDROCHLORIDE 12.5 MG: 50 INJECTION, SOLUTION INTRAMUSCULAR; INTRAVENOUS at 10:31

## 2023-12-06 RX ADMIN — ONDANSETRON 4 MG: 2 INJECTION INTRAMUSCULAR; INTRAVENOUS at 11:07

## 2023-12-06 RX ADMIN — SODIUM CHLORIDE, POTASSIUM CHLORIDE, SODIUM LACTATE AND CALCIUM CHLORIDE 50 ML/HR: 600; 310; 30; 20 INJECTION, SOLUTION INTRAVENOUS at 07:32

## 2023-12-06 RX ADMIN — PROPOFOL 200 MG: 10 INJECTION, EMULSION INTRAVENOUS at 10:04

## 2023-12-06 RX ADMIN — CEFAZOLIN SODIUM 2 G: 2 INJECTION, SOLUTION INTRAVENOUS at 10:08

## 2023-12-06 RX ADMIN — LIDOCAINE HYDROCHLORIDE 80 MG: 20 INJECTION, SOLUTION INFILTRATION; PERINEURAL at 10:04

## 2023-12-06 RX ADMIN — DEXAMETHASONE SODIUM PHOSPHATE 8 MG: 4 INJECTION, SOLUTION INTRAMUSCULAR; INTRAVENOUS at 10:18

## 2023-12-06 RX ADMIN — ROCURONIUM BROMIDE 50 MG: 10 SOLUTION INTRAVENOUS at 10:04

## 2023-12-06 RX ADMIN — DEXAMETHASONE SODIUM PHOSPHATE: 10 INJECTION, SOLUTION INTRAMUSCULAR; INTRAVENOUS at 08:34

## 2023-12-06 RX ADMIN — FAMOTIDINE 20 MG: 10 INJECTION, SOLUTION INTRAVENOUS at 10:31

## 2023-12-06 RX ADMIN — MIDAZOLAM 2 MG: 1 INJECTION INTRAMUSCULAR; INTRAVENOUS at 08:30

## 2023-12-06 SDOH — HEALTH STABILITY: MENTAL HEALTH: CURRENT SMOKER: 0

## 2023-12-06 ASSESSMENT — PAIN SCALES - GENERAL
PAINLEVEL_OUTOF10: 0 - NO PAIN
PAIN_LEVEL: 0
PAINLEVEL_OUTOF10: 0 - NO PAIN
PAINLEVEL_OUTOF10: 2
PAINLEVEL_OUTOF10: 0 - NO PAIN
PAINLEVEL_OUTOF10: 2
PAINLEVEL_OUTOF10: 0 - NO PAIN
PAINLEVEL_OUTOF10: 0 - NO PAIN

## 2023-12-06 ASSESSMENT — PAIN - FUNCTIONAL ASSESSMENT
PAIN_FUNCTIONAL_ASSESSMENT: 0-10

## 2023-12-06 ASSESSMENT — COLUMBIA-SUICIDE SEVERITY RATING SCALE - C-SSRS
2. HAVE YOU ACTUALLY HAD ANY THOUGHTS OF KILLING YOURSELF?: NO
1. IN THE PAST MONTH, HAVE YOU WISHED YOU WERE DEAD OR WISHED YOU COULD GO TO SLEEP AND NOT WAKE UP?: NO
6. HAVE YOU EVER DONE ANYTHING, STARTED TO DO ANYTHING, OR PREPARED TO DO ANYTHING TO END YOUR LIFE?: NO

## 2023-12-06 ASSESSMENT — PAIN DESCRIPTION - DESCRIPTORS
DESCRIPTORS: ACHING
DESCRIPTORS: ACHING

## 2023-12-06 NOTE — OP NOTE
BICEPS TENOTOMY (R), ARTHROSCOPY, BICEPS, TENOTOMY, SHOULDER ROTATOR CUFF REPAIR (R) Operative Note     Date: 2023  OR Location: [facility]    Name: Mal Lopez, : 1950, Age: 73 y.o., MRN: [id number], Sex: male    Diagnosis  Pre-op Diagnosis     * Complete rotator cuff tear or rupture of right shoulder, not specified as traumatic [M75.121] Post-op Diagnosis     * Complete rotator cuff tear or rupture of right shoulder, not specified as traumatic [M75.121]     Procedures  BICEPS TENOTOMY  38288 - [place] TENOTOMY SHOULDER AREA 1 TENDON    ARTHROSCOPY, BICEPS, TENOTOMY, SHOULDER ROTATOR CUFF REPAIR  62035 - [place] SURGICAL ARTHROSCOPY SHOULDER W/ROTATOR CUFF RPR      Surgeons      * Allen Almanza - Primary    Resident/Fellow/Other Assistant:  Surgeon(s) and Role:     * Suresh Sapp PA-C - Assisting    Procedure Summary  Anesthesia: General  ASA: II  Anesthesia Staff: Anesthesiologist: Johnny Garcia MD  CRNA: SHRUTI Cassidy-CRNA, DNAP  Estimated Blood Loss: Minimal  Intra-op Medications:   Medication Name Total Dose   sodium chloride 0.9 % irrigation solution 9,000 mL   EPINEPHrine HCl (PF) (Adrenalin) injection 1 mg   ceFAZolin in dextrose (iso-os) (Ancef) IVPB 2 g 2 g              Anesthesia Record               Intraprocedure I/O Totals          Intake    ceFAZolin in dextrose (iso-os) (Ancef) IVPB 2 g 100.00 mL    Total Intake 100 mL          Specimen: No specimens collected     Staff:   Circulator: Melvin Reardon RN  Relief Scrub: Kassidy Austin  Scrub Person: Michelle Hooper         Drains and/or Catheters: * None in log *    Tourniquet Times:         Implants:  Implants       Type Name Action Serial No.      Implant SUTURE, 2.6 FIBERTAK RC SP, 1.7 TIGERTAPE LOOP, WH/BLK - [id number] Implanted      Implant SUTURE, 2.6 FIBERTAK RC SP, 1.7 FIBERTAPE LOOP, BLUE - [id number] Implanted      Implant SUTURE, 2.6 FIBERTAK RC SP, 1.7 TIGERTAPE LOOP, WH/BLK - [id number] Implanted      Screw ANCHOR, BIOCOMPOSITE  SWIVELOCK 4.75 X 19.1 - [id number] Implanted      Screw ANCHOR, BIOCOMPOSITE SWIVELOCK 4.75 X 19.1 - [id number] Implanted               Findings: Large rotator cuff tear, high-grade partial tear long head of the biceps with medial subluxation and downsloping anterior acromion    Indications: Mal Lopez is an 73 y.o. male who is having surgery for Complete rotator cuff tear or rupture of right shoulder, not specified as traumatic [M75.121].     The patient was seen in the preoperative area. The risks, benefits, complications, treatment options, non-operative alternatives, expected recovery and outcomes were discussed with the patient. The possibilities of reaction to medication, pulmonary aspiration, injury to surrounding structures, bleeding, recurrent infection, the need for additional procedures, failure to diagnose a condition, and creating a complication requiring transfusion or operation were discussed with the patient. The patient concurred with the proposed plan, giving informed consent.  The site of surgery was properly noted/marked if necessary per policy. The patient has been actively warmed in preoperative area. Preoperative antibiotics have been ordered and given within 1 hours of incision. Venous thrombosis prophylaxis are not indicated.    Procedure Details: Findings (Outerbridge classification):  Glenoid: 2  Humeral head: 2     Procedure:  The patient was met in pre-operative holding and the appropriate extremity was marked.  The patient was transported to the operating room and underwent general anesthesia.  The patient was placed in a lateral position with all bony prominences well padded including the common peroneal nerve and ulnar nerve.  An axillary roll was placed.  The ipsilateral arm was suspended with 10lbs of weight and an arm awad was carefully wrapped around the arm.  Antibiotics were administered according to protocol.   After prep, drape, antibiotics and time out the bony landmarks  were palpated.     A surgical timeout was performed.  The patient was identified, the site and laterality confirmed along with administration of antibiotics.    Next I passed a spinal needle posteriorly and infiltrated the glenohumeral joint with arthroscopic fluid.  I received a return of fluid confirming my proper positioning.       A posterior portal was created with an 11 blade and the glenohumeral joint was entered using a blunt trochar without issue.  A diagnostic arthroscopy was performed evaluating the articular cartilage of the humeral head and glenoid, the subscapularis, under surface of the supra and infraspinatus, the long head of the biceps, labrum and axillary recess.       An anterior portal was created in the rotator interval (outside / in) with an 18 G spinal needle and a blunt trocar was inserted. A probe was introduced and the biceps / labrum were palpated.    The labrum was palpated.  There is degenerative fraying and some tearing of the superior labrum.  There was a synovitis present anteriorly.  I debrided this with a shaver as well as with an arthroscopic radiofrequency probe.  I readily identified a large rotator cuff tear that involve the infraspinatus and supraspinatus.  The subscapularis was palpated and was intact.     The long head of the patient's biceps tendon was noted to be diseased with some interstitial tearing.  Due to the patient's age and activity level and overall tissue quality, a tenotomy was deemed to be appropriate.  Using the anterior portal, a cautery wand was used to tenotomize the tendon just above the superior labrum.  The residual superior labrum was gently debrided.  The biceps retracted to the top of the humeral head.     The scope was introduce into the subacromial space.  I used the cannula for the arthroscope as a switching stick to reestablish my anterior portal for inflow into the subacromial space.  Upon entering the subacromial space I did note a thickened  and hypertrophied bursa.  A lateral portal was created and using a shaver and cautery wand a thorough bursectomy was performed. The rotator cuff was evaluated from the bursal side.  The large rotator cuff tear was readily identified.     The patient was noted to have significant bursal inflammation.  After the bursectomy the acromion was inspected and noted to have some downsloping.  Using the lateral portal an acromioplasty was performed. The coracohumeral ligament was slightly recessed.   The acromioplasty was performed until the cuff was deemed to have appropriate space and the acromion had a smooth surface.     The patient's rotator cuff was evaluated from its articular side and bursal side.  The patient was noted to have a full-thickness tear involving the supraspinatus and infraspinatus.  We felt the patient was suitable for a rotator cuff repair.  The greater tuberosity was gently decorticated.   Once the cuff had been appropriately identified, a percutaneous spinal needle was placed just off the edge of the acromion.  Then in standard fashion by making a small skin incision and using an awl to create the  holes.  I placed 3 medial row anchors from anterior to posterior and passed the suture through the cuff with the PA retrieving the limbs.  The PA then  the limbs of suture.  Next I placed 2 lateral row anchors.  This was done by using the awl.  Taking a single limb from the medial row anchors and incorporating them into a swivel lock anchor.  This was done from posterior to anterior.  The anchor was then engaged into the lateral tuberosity.    The shoulder was copiously lavaged and closed using 3-0 monofilament suture. Sterile 4 x 4's, abd pads were placed followed by foam tape.  The patient was placed in a sling and stable to recovery.  All counts were reported as correct.  There were no complications.     The physician assistant was present for the entire case.  Given the nature of the  procedure and disease process a skilled surgical assistant was necessary for the case.  The assistant was necessary for retraction and helped directly facilitate completion of the surgery.  A certified scrub tech was at the back table managing instruments and supplies for the surgical procedure.    Complications:  None; patient tolerated the procedure well.    Disposition: PACU - hemodynamically stable.  Condition: stable         Additional Details: Not applicable    Attending Attestation: I performed the procedure.    Allen Almanza  Phone Number: [phone]

## 2023-12-06 NOTE — ANESTHESIA PROCEDURE NOTES
Peripheral Block    Patient location during procedure: holding area  Start time: 12/6/2023 8:30 AM  End time: 12/6/2023 8:39 AM  Reason for block: at surgeon's request and post-op pain management  Staffing  Performed: attending   Authorized by: Johnny Garcia MD    Performed by: Johnny Garcai MD  Preanesthetic Checklist  Completed: patient identified, IV checked, site marked, risks and benefits discussed, surgical consent, monitors and equipment checked, pre-op evaluation and timeout performed   Timeout performed at: 12/6/2023 8:30 AM  Peripheral Block  Patient position: laying flat  Prep: ChloraPrep  Patient monitoring: heart rate, cardiac monitor and continuous pulse ox  Block type: interscalene  Laterality: right  Injection technique: single-shot  Guidance: ultrasound guided  Infiltration strength: 2 %  Dose: 3 mL  Needle  Needle gauge: 21 G  Needle length: 10 cm  Needle localization: ultrasound guidance     image stored in chart  Assessment  Injection assessment: negative aspiration for heme, no paresthesia on injection, incremental injection and local visualized surrounding nerve on ultrasound  Paresthesia pain: none  Heart rate change: no  Slow fractionated injection: yes  Additional Notes  Following timeout, sedation administered for patient comfort. Skin prep with chlorhexadine, local infiltration with 2% lidocaine. US for visualization of structures, real-time visualization of needle entry, visualization of local anesthetic spread. Aspiration negative for heme. 20cc 0.5% ropivacaine with 5mg dexamethasone injected in divided doses. Patient awake and conversant throughout, tolerated well with no apparent complications.

## 2023-12-06 NOTE — H&P
"History Of Present Illness  Mal Lopez is a 73 y.o. male presenting with right shoulder pain secondary to a rotator cuff tear.     Past Medical History  Past Medical History:   Diagnosis Date    Allergic rhinitis due to pollen     Hay fever    Anemia     COVID-19     VACCINATED    Deviated nasal septum     Hearing aid worn     Osteoarthritis     Personal history of other diseases of the respiratory system     Personal history of asthma    PONV (postoperative nausea and vomiting)     PATIENT STATED HAS LOW BLOOD PRESSURE IN THE PAST AFTER ANESTH.    Wears glasses        Surgical History  Past Surgical History:   Procedure Laterality Date    SEPTOPLASTY      DEVIATED SEPTAL NASAL REPAIR    TONSILLECTOMY          Social History  He reports that he has never smoked. He has never used smokeless tobacco. He reports that he does not currently use alcohol. Drug use questions deferred to the physician.    Family History  Family History   Problem Relation Name Age of Onset    Other (malignant neoplasm) Mother      Other (cardiac disorder) Father          Allergies  House dust and Penicillins    Review of Systems  Noncontributory     Physical Exam     Last Recorded Vitals  Blood pressure (!) 144/96, pulse 72, temperature 36.4 °C (97.5 °F), temperature source Temporal, resp. rate 11, height 1.803 m (5' 11\"), weight 91.3 kg (201 lb 4.5 oz), SpO2 99 %.  HEENT: Head is normocephalic and atraumatic, neck is supple and nontender  Chest and lungs: Clear to auscultation  Heart: Regular rate and rhythm, no murmurs  Abdomen: Positive bowel sounds soft nontender  Upper extremity:  Positive Jobes test and [place]'s test right shoulder    Relevant Results  MR shoulder  Status: Final result     PACS Images - [id number]     Show images for MR shoulder  Signed by    Signed Time Phone Pager   Jimmie Luong MD 9/08/2023 07:21 [phone]     Exam Information    Status Exam Begun Exam Ended   Final  9/07/2023 22:05     Study " Result    Narrative & Impression   Interpreted By:  JAMES JOHNSON MD  MRN: [id number]  Patient Name: DARWIN AMARAL     STUDY:  MRI SHOULDER W/O CONTRAST;  9/7/2023 10:05 pm     INDICATION:  Unspecified rotator cuff tear or rupture of right shoulder, not  specified as traumatic.     COMPARISON:  Radiographs from 08/21/2023     ACCESSION NUMBER(S):  [id number]     ORDERING CLINICIAN:  BRETT LIANG     TECHNIQUE:  MR imaging of the  right shoulder was obtained  without  administration of intravenous contrast medium.     FINDINGS:  ROTATOR CUFF TENDONS:  There is a full-thickness tear of the distal supraspinatus and  anterior infraspinatus tendons measuring 3.5 cm in AP dimension with  mild retraction to the level of the acromion by 2 cm. There is  associated fatty infiltration as well as muscle edema in the  supraspinatus and infraspinatus muscles.  There is a full-thickness tear of the superior fibers of the  subscapularis tendon without associated muscle atrophy or edema.     BICEPS TENDON AND ROTATOR INTERVAL:  There is tendinopathy of the long head of the biceps tendon which is  medially dislocated outside of the bicipital groove and under the  through the torn subscapularis.     JOINTS:  Moderate cartilage loss present the glenohumeral joint. There is  osteophytosis. There is truncation and tearing of the glenoid labrum.  There is moderate cartilage loss with osteophytosis in the  acromioclavicular joint as well.  There is a small amount of fluid in the glenohumeral joint which  extends into the subacromial subdeltoid bursa through the cuff defect     OSSEOUS STRUCTURES:  No fracture or marrow replacing lesion seen.     SOFT TISSUES:  No soft tissue abnormality seen.     IMPRESSION:  1. Full-thickness tear of the distal supraspinatus and anterior  infraspinatus tendon measuring 3.5 cm in AP dimension with mild  retraction to the level of the acromion. Associated muscle edema in  the musculature suggestive of an  acute on chronic injury.  2. Full-thickness tear of the superior fibers of the subscapularis.  3. Medial dislocation of the markedly tendinopathic long head of the  biceps tendon underneath the torn subscapularis  4. Moderate degenerative changes in the AC and glenohumeral joints.  Small amount of joint fluid in the glenohumeral joint extending into  the bursa.           Assessment/Plan   Principal Problem:    Complete rotator cuff tear or rupture of right shoulder, not specified as traumatic  Partial tearing and subluxation right long head of the biceps    We are planning an arthroscopic right rotator cuff repair and biceps tenotomy.  The procedure has been explained along the risks, benefits alternatives being reviewed.  Questions were answered.         Allen Almanza MD

## 2023-12-06 NOTE — ANESTHESIA PREPROCEDURE EVALUATION
Mal Lopez is a 73 y.o. male here for:    BICEPS TENOTOMY, ARTHROSCOPY SHOULDER ROTATOR CUFF REPAIR LATERAL DECUB, ARTHREX  With Allen Almanza MD  Pre-Op Diagnosis Codes:     * Complete rotator cuff tear or rupture of right shoulder, not specified as traumatic [M75.121]    Relevant Problems   Cardiovascular   (+) Hyperlipidemia      Hematology   (+) Anemia      Musculoskeletal   (+) Complete rotator cuff tear or rupture of right shoulder, not specified as traumatic       Lab Results   Component Value Date    HGB 12.9 (L) 11/27/2023    HCT 39.2 (L) 11/27/2023    WBC 4.9 11/27/2023     11/27/2023     11/27/2023    K 4.4 11/27/2023     11/27/2023    CREATININE 0.79 11/27/2023    BUN 20 11/27/2023     EKG 11/2023:  Normal sinus rhythm  Normal ECG  No previous ECGs available    Social History     Tobacco Use   Smoking Status Never   Smokeless Tobacco Never       Allergies   Allergen Reactions    House Dust Other     SNEEZING    Penicillins Dizziness       Current Outpatient Medications   Medication Instructions    celecoxib (CELEBREX) 200 mg, oral, Daily    oxyCODONE (ROXICODONE) 5 mg, oral, Every 6 hours PRN       Past Surgical History:   Procedure Laterality Date    SEPTOPLASTY      DEVIATED SEPTAL NASAL REPAIR    TONSILLECTOMY         Family History   Problem Relation Name Age of Onset    Other (malignant neoplasm) Mother      Other (cardiac disorder) Father         NPO Details:  NPO/Void Status  Carbonhydrate Drink Given Prior to Surgery? : N  Date of Last Liquid: 12/05/23  Time of Last Liquid: 2200  Date of Last Solid: 12/05/23  Time of Last Solid: 2200  Last Intake Type: Clear fluids; Food  Time of Last Void: 0530        Physical Exam    Airway  Mallampati: II  TM distance: >3 FB  Neck ROM: full     Cardiovascular    Dental - normal exam     Pulmonary    Abdominal            Anesthesia Plan    ASA 2     general and regional     The patient is not a current smoker.    intravenous induction    Postoperative administration of opioids is intended.  Trial extubation is planned.  Anesthetic plan and risks discussed with patient.    Plan discussed with CRNA.

## 2023-12-06 NOTE — ANESTHESIA PROCEDURE NOTES
Airway  Date/Time: 12/6/2023 10:06 AM  Urgency: elective      Staffing  Performed: SRNA   Authorized by: Johnny Garcia MD    Performed by: SHRUTI Cassidy-CRNA, DNAP  Patient location during procedure: OR    Indications and Patient Condition  Indications for airway management: anesthesia  Spontaneous ventilation: present  Sedation level: minimal  Preoxygenated: yes  Patient position: sniffing  Mask difficulty assessment: 2 - vent by mask + OA or adjuvant +/- NMBA  Planned trial extubation    Final Airway Details  Final airway type: endotracheal airway      Successful airway: ETT     Successful intubation technique: direct laryngoscopy  Facilitating devices/methods: intubating stylet and anterior pressure/BURP  Endotracheal tube insertion site: oral  Blade: Rashmi  Blade size: #4  ETT size (mm): 7.5  Cormack-Lehane Classification: grade IIa - partial view of glottis  Placement verified by: chest auscultation and capnometry   Measured from: teeth  ETT to teeth (cm): 23  Number of attempts at approach: 1

## 2023-12-06 NOTE — DISCHARGE INSTRUCTIONS
General Anesthesia Discharge Instructions    About this topic  You may need general anesthesia if you need to be asleep during a procedure. Your doctor will use drugs to block the signals that go from your nerves to your brain. Doctors give general anesthesia during a surgery or procedure to:  Allow you to sleep  Help your body be still  Relax your muscles  Help you to relax and be pain free  Keep you from remembering the surgery  Let the doctor manage your airway, breathing, and blood flow  The doctor or nurse anesthetist gives general anesthesia by a shot into your vein. Sometimes, you may breathe in a gas through a mask placed over your face.  What care is needed at home?  Ask your doctor what you need to do when you go home. Make sure you ask questions if you do not understand what the doctor says.  Your doctor may give you drugs to prevent or treat an upset stomach from the anesthetic. Take them as ordered.  If your throat is sore, suck on ice chips or popsicles to ease throat pain.  Put 2 to 3 pillows under your head and back when you lie down to help you breathe easier.  For the first 24 to 48 hours:  Do not operate heavy or dangerous machinery.  Do not make major decisions or sign important papers. You may not be able to think clearly.  Avoid beer, wine, or mixed drinks.  You are at a higher risk of falling for at least 24 hours after general anesthesia.  Take extra care when you get up.  Do not change positions quickly.  Do not rush when you need to go to the bathroom or to answer the phone.  Ask for help if you feel unsteady when you try to walk.  Wear shoes with non-slip soles and low heels.  What follow-up care is needed?  Your doctor may ask you to come back to the office to check on your progress. Be sure to keep these visits.  If you have stitches that do not dissolve or staples, you will need to have them removed. Your doctor will want to do this in 1 to 2 weeks. If the doctor used skin glue, the  glue will fall off on its own.  What drugs may be needed?  The doctor may order drugs to:  Help with pain  Treat an upset stomach or throwing up  Will physical activity be limited?  You will not be allowed to drive right away after the procedure. Ask a family member or a friend to drive you home.  Avoid trying to get out of bed without help until you are sure of your balance.  You may have to limit your activity. Talk to your doctor about if you need to limit how much you lift or limit exercise after your procedure.  What changes to diet are needed?  Start with a light diet when you are fully awake. This includes things that are easy to swallow like soups, pudding, jello, toast, and eggs. Slowly progress to your normal diet.  What problems could happen?  Low blood pressure  Breathing problems  Upset stomach or throwing up  Dizziness  Blood clots  Infection  When do I need to call the doctor?  Trouble breathing  Upset stomach or throwing up more than 3 times in the next 2 days  Dizziness  Teach Back: Helping You Understand  The Teach Back Method helps you understand the information we are giving you. After you talk with the staff, tell them in your own words what you learned. This helps to make sure the staff has described each thing clearly. It also helps to explain things that may have been confusing. Before going home, make sure you can do these:  I can tell you about my procedure.  I can tell you if I need to follow up with my doctor.  I can tell you what is good for me to eat and drink the next day.  I can tell you what I would do if I have trouble breathing, an upset stomach, or dizziness.  Where can I learn more?  [employer]  https://www.nigms.nih.gov/education/pages/factsheet_Anesthesia.aspx  [facility] Choices  http://www.nhs.uk/conditions/Anaesthetic-general/Pages/Definition.aspx  Last Reviewed Date  2020-04-22

## 2023-12-06 NOTE — ANESTHESIA POSTPROCEDURE EVALUATION
Patient: Mal Lopez    Procedure Summary       Date: 12/06/23 Room / Location: [facility] / [facility]    Anesthesia Start: 0957 Anesthesia Stop: 1138    Procedures:       BICEPS TENOTOMY (Right: Shoulder)      SHOULDER ROTATOR ARTHROSCOPY, ARTHROSCOPIC BICEPS TENOTOMY, ROTAOT CUFF REPAIR, EXTENSIVE DEBRIDEMENT, ACROMIOPLASTY (Right: Shoulder) Diagnosis:       Complete rotator cuff tear or rupture of right shoulder, not specified as traumatic      (Complete rotator cuff tear or rupture of right shoulder, not specified as traumatic [M75.121])    Surgeons: Allen Almanza MD Responsible Provider: Johnny Garcia MD    Anesthesia Type: general, regional ASA Status: 2            Anesthesia Type: general, regional    Vitals Value Taken Time   /81 12/06/23 1134   Temp 36 °C (96.8 °F) 12/06/23 1132   Pulse 74 12/06/23 1137   Resp 17 12/06/23 1137   SpO2 97 % 12/06/23 1137   Vitals shown include unvalidated device data.    Anesthesia Post Evaluation    Patient location during evaluation: PACU  Patient participation: complete - patient participated  Level of consciousness: awake and alert  Pain score: 0  Pain management: adequate  Airway patency: patent  Cardiovascular status: acceptable and hemodynamically stable  Respiratory status: acceptable, face mask, nonlabored ventilation and spontaneous ventilation  Hydration status: euvolemic  Postoperative Nausea and Vomiting: none        There were no known notable events for this encounter.

## 2023-12-07 ENCOUNTER — APPOINTMENT (OUTPATIENT)
Dept: ORTHOPEDIC SURGERY | Facility: CLINIC | Age: 73
End: 2023-12-07
Payer: MEDICARE

## 2023-12-14 ENCOUNTER — OFFICE VISIT (OUTPATIENT)
Dept: ORTHOPEDIC SURGERY | Facility: CLINIC | Age: 73
End: 2023-12-14
Payer: MEDICARE

## 2023-12-14 ENCOUNTER — APPOINTMENT (OUTPATIENT)
Dept: ORTHOPEDIC SURGERY | Facility: CLINIC | Age: 73
End: 2023-12-14
Payer: MEDICARE

## 2023-12-14 DIAGNOSIS — Z98.890 S/P RIGHT ROTATOR CUFF REPAIR: Primary | ICD-10-CM

## 2023-12-14 PROCEDURE — 1126F AMNT PAIN NOTED NONE PRSNT: CPT | Performed by: ORTHOPAEDIC SURGERY

## 2023-12-14 PROCEDURE — 1159F MED LIST DOCD IN RCRD: CPT | Performed by: ORTHOPAEDIC SURGERY

## 2023-12-14 PROCEDURE — 1160F RVW MEDS BY RX/DR IN RCRD: CPT | Performed by: ORTHOPAEDIC SURGERY

## 2023-12-14 PROCEDURE — 99024 POSTOP FOLLOW-UP VISIT: CPT | Performed by: ORTHOPAEDIC SURGERY

## 2023-12-14 PROCEDURE — 1036F TOBACCO NON-USER: CPT | Performed by: ORTHOPAEDIC SURGERY

## 2023-12-14 RX ORDER — OXYCODONE HYDROCHLORIDE 5 MG/1
5 TABLET ORAL EVERY 6 HOURS PRN
Qty: 28 TABLET | Refills: 0 | Status: SHIPPED | OUTPATIENT
Start: 2023-12-14 | End: 2023-12-21

## 2023-12-14 NOTE — PROGRESS NOTES
History of Present Illness  Chief Complaint   Patient presents with    Right Shoulder - Follow-up     RT RTC 2/6/2023       The patient is status post side: right shoulder arthroscopy with a rotator cuff repair repair and biceps tenotomy.  Date of surgery December 6, 2023  they complain of mild pain.  They deny any numbness or tingling in the right upper extremity.    Review of Systems   GENERAL: Negative for malaise, significant weight loss, fever  MUSCULOSKELETAL: see HPI  NEURO:  Negative    Exam  Portal sites are healing well.  There is no erythema, warmth or purulent drainage.  They have intact sensation along the lateral border of the upper arm.  They have intact thumb extension, wrist extension, finger flexion.  Range of motion:  not tested    Assessment  Status post side: right shoulder arthroscopy with rotator cuff repair and  biceps tenotomy    Plan  sutures removed, intra operative images reviewed with the patient, and continue sling  I will see them back in 5 weeks for recheck, sooner if there is any problems.    Codman and pendulum exercises were demonstrated that he may do  No active shoulder abduction  Pain medication refilled: Yes  All questions were answered.

## 2023-12-14 NOTE — PROGRESS NOTES
History of Present Illness  No chief complaint on file.      The patient is status post {side:10382} shoulder arthroscopy with a {shoulder arthroscopy surgical treatment:36247} repair.  Date of surgery {DATE:23014} they complain of {MILD, MOD, SEV:34415} pain.  They deny any numbness or tingling in the right upper extremity.    Review of Systems   GENERAL: Negative for malaise, significant weight loss, fever  MUSCULOSKELETAL: see HPI  NEURO:  Negative    Exam  Portal sites are {Incision status:68006}.  There is no erythema, warmth or purulent drainage.  They have intact sensation along the lateral border of the upper arm.  They have intact thumb extension, wrist extension, finger flexion.  Range of motion: {SHOULDER RANGE OF MOTION:01852}    Assessment  Status post {side:47305} shoulder arthroscopy with {shoulder arthroscopy surgical treatment:73379}    Plan  {post op shoulder plan:15210}  I will see them back in {#:89195} weeks for recheck, sooner if there is any problems.    Pain medication refilled: {yes,no:41320}  All questions were answered.

## 2023-12-28 ENCOUNTER — TELEPHONE (OUTPATIENT)
Dept: ORTHOPEDIC SURGERY | Facility: CLINIC | Age: 73
End: 2023-12-28
Payer: MEDICARE

## 2023-12-28 NOTE — TELEPHONE ENCOUNTER
Patient was called, he was advised he needs to elevate his legs when resting, as he admitted to sitting more than normal.  He was advised to watch his salt intake as well.  Patient does not have a history of edema or taking diuretics.   He inquired if he can continue taking the Ibuprofen 800mg.  He is doing this once daily.  He was advised, yes if this is helping to continue taking this dose with a meal.    Patient stated understanding and had no further questions.

## 2023-12-28 NOTE — TELEPHONE ENCOUNTER
Patient has some questions about swelling in his feet and would like a call from Janice to discuss.    He can be reached at [phone]

## 2024-01-17 NOTE — PROGRESS NOTES
History of Present Illness  No chief complaint on file.      The patient is status post side: right shoulder arthroscopy with a rotator cuff repair and biceps tenotomy.  Date of surgery December 6, 2023  they complain of mild pain.  They deny any numbness or tingling in the right upper extremity.    Review of Systems   GENERAL: Negative for malaise, significant weight loss, fever  MUSCULOSKELETAL: see HPI  NEURO:  Negative    Exam  Portal sites are healing well.  There is no erythema, warmth or purulent drainage.  They have intact sensation along the lateral border of the upper arm.  They have intact thumb extension, wrist extension, finger flexion.  Range of motion:  Forward flexion to 80 degrees internal rotation to the back pocket external rotation of 70 degrees abduction to 70 degrees with shoulder hike    Assessment  Status post side: right shoulder arthroscopy with rotator cuff repair and biceps tenotomy    Plan:  Discontinue pillow  Continue to wean out of sling  Outpatient physical therapy  I will see them back in 6 weeks for recheck, sooner if there is any problems.    Pain medication refilled: No  Off work until March 6 then anticipate return with restrictions  All questions were answered.

## 2024-01-18 ENCOUNTER — OFFICE VISIT (OUTPATIENT)
Dept: ORTHOPEDIC SURGERY | Facility: CLINIC | Age: 74
End: 2024-01-18
Payer: MEDICARE

## 2024-01-18 DIAGNOSIS — Z98.890 S/P RIGHT ROTATOR CUFF REPAIR: Primary | ICD-10-CM

## 2024-01-18 PROCEDURE — 99024 POSTOP FOLLOW-UP VISIT: CPT | Performed by: ORTHOPAEDIC SURGERY

## 2024-01-18 PROCEDURE — 1036F TOBACCO NON-USER: CPT | Performed by: ORTHOPAEDIC SURGERY

## 2024-01-18 PROCEDURE — 1159F MED LIST DOCD IN RCRD: CPT | Performed by: ORTHOPAEDIC SURGERY

## 2024-01-18 PROCEDURE — 1126F AMNT PAIN NOTED NONE PRSNT: CPT | Performed by: ORTHOPAEDIC SURGERY

## 2024-01-18 PROCEDURE — 1160F RVW MEDS BY RX/DR IN RCRD: CPT | Performed by: ORTHOPAEDIC SURGERY

## 2024-02-21 ENCOUNTER — CLINICAL SUPPORT (OUTPATIENT)
Dept: AUDIOLOGY | Facility: CLINIC | Age: 74
End: 2024-02-21
Payer: MEDICARE

## 2024-02-21 ENCOUNTER — OFFICE VISIT (OUTPATIENT)
Dept: OTOLARYNGOLOGY | Facility: CLINIC | Age: 74
End: 2024-02-21
Payer: MEDICARE

## 2024-02-21 DIAGNOSIS — H93.13 TINNITUS OF BOTH EARS: ICD-10-CM

## 2024-02-21 DIAGNOSIS — H90.3 ASYMMETRICAL SENSORINEURAL HEARING LOSS: Primary | ICD-10-CM

## 2024-02-21 DIAGNOSIS — H90.3 ASYMMETRIC SNHL (SENSORINEURAL HEARING LOSS): Primary | ICD-10-CM

## 2024-02-21 PROCEDURE — 1126F AMNT PAIN NOTED NONE PRSNT: CPT | Performed by: OTOLARYNGOLOGY

## 2024-02-21 PROCEDURE — 92557 COMPREHENSIVE HEARING TEST: CPT | Performed by: AUDIOLOGIST

## 2024-02-21 PROCEDURE — 99213 OFFICE O/P EST LOW 20 MIN: CPT | Performed by: OTOLARYNGOLOGY

## 2024-02-21 PROCEDURE — 1159F MED LIST DOCD IN RCRD: CPT | Performed by: OTOLARYNGOLOGY

## 2024-02-21 PROCEDURE — 1036F TOBACCO NON-USER: CPT | Performed by: OTOLARYNGOLOGY

## 2024-02-21 PROCEDURE — 92567 TYMPANOMETRY: CPT | Performed by: AUDIOLOGIST

## 2024-02-21 NOTE — PROGRESS NOTES
Mr. Lopez, age 73, was seen today for a repeat hearing evaluation during his ENT visit with Dr. Smith.  He has a history of hearing loss, left greater than right with binaural hearing aids from [employer].  He denied significant change in his hearing as compared to his last evaluation November 2023.  He does have constant bilateral tinnitus.    Results:  Otoscopy revealed clear ear canals and tympanic membranes were visualized bilaterally.  Tympanometry revealed normal, Type A tympanograms, indicating normal ear canal volume, peak pressure and compliance bilaterally.  Audiometric thresholds revealed a slight sloping to profound sensorineural hearing loss in his right ear and moderate to profound sensorineural hearing loss in his left ear.  Word recognition scores were good at 84% in his right ear and fair at 68% in his left ear.  Word recognition scores in his left ear have improved from 48% to 68% as compared to his evaluation November 2023.  All other results have remained stable.    Recommendations:  Follow-up with PCP, Dr. Yee, as medically directed.  Follow-up with ENT, Dr. Smith, as medically directed.  Consistent use of binaural amplification.  Retest hearing annually to monitor.

## 2024-02-21 NOTE — PROGRESS NOTES
Mal Lopez is a 73 y.o. year old male patient with 3 MONTH FU ON EARS / HEARING     Patient presents to the office today for follow-up on ears and hearing.  He has a history of bilateral hearing loss with hearing aids from [employer].  Patient had poor word discrimination left ear previously.  Here today for follow-up.  All other ENT issues are negative.          Physical Exam:   General appearance: No acute distress. Normal facies. Symmetric facial movement. No gross lesions of the face are noted.  The external ear structures appear normal. The ear canals patent and the tympanic membranes are intact without evidence of air-fluid levels, retraction, or congenital defects.  Anterior rhinoscopy notes essentially a midline nasal septum. Examination is noted for normal healthy mucosal membranes without any evidence of lesions, polyps, or exudate. The tongue is normally mobile. There are no lesions on the gingiva, buccal, or oral mucosa. There are no oral cavity masses.  The neck is negative for mass lymphadenopathy. The trachea and parotid are clear. The thyroid bed is grossly unremarkable. The salivary gland structures are grossly unremarkable.    Audiogram:  Audiogram demonstrates bilateral sensorineural hearing loss with asymmetry left greater than right.  Right ear with mild to severe sloping loss left ear with moderate to severe sloping loss.  Patient with poor discrimination 84% right 68% left which is now improved from previous exam.    Assessment/Plan   1.  Asymmetric sensorineural hearing loss  Patient with asymmetric loss with improved word discrimination.  We will hold off on MRI at this time and see him back in 6 months.     All questions were answered in this regard accordingly.

## 2024-02-29 ENCOUNTER — OFFICE VISIT (OUTPATIENT)
Dept: ORTHOPEDIC SURGERY | Facility: CLINIC | Age: 74
End: 2024-02-29
Payer: MEDICARE

## 2024-02-29 DIAGNOSIS — Z98.890 S/P RIGHT ROTATOR CUFF REPAIR: Primary | ICD-10-CM

## 2024-02-29 PROCEDURE — 1126F AMNT PAIN NOTED NONE PRSNT: CPT | Performed by: ORTHOPAEDIC SURGERY

## 2024-02-29 PROCEDURE — 1159F MED LIST DOCD IN RCRD: CPT | Performed by: ORTHOPAEDIC SURGERY

## 2024-02-29 PROCEDURE — 1036F TOBACCO NON-USER: CPT | Performed by: ORTHOPAEDIC SURGERY

## 2024-02-29 PROCEDURE — 99024 POSTOP FOLLOW-UP VISIT: CPT | Performed by: ORTHOPAEDIC SURGERY

## 2024-02-29 RX ORDER — CYCLOBENZAPRINE HCL 10 MG
10 TABLET ORAL 3 TIMES DAILY PRN
Qty: 30 TABLET | Refills: 0 | Status: SHIPPED | OUTPATIENT
Start: 2024-02-29 | End: 2024-03-10

## 2024-02-29 ASSESSMENT — PAIN - FUNCTIONAL ASSESSMENT: PAIN_FUNCTIONAL_ASSESSMENT: NO/DENIES PAIN

## 2024-02-29 NOTE — LETTER
March 28, 2024     Patient: aMl Lopez   YOB: 1950   Date of Visit: 2/29/2024       To Whom It May Concern:    It is my medical opinion that Mal Lopez {Work release (duty restriction):17879}.    If you have any questions or concerns, please don't hesitate to call.         Sincerely,        Allen Almanza MD    CC: No Recipients

## 2024-02-29 NOTE — LETTER
February 29, 2024     Patient: Mal Lopez   YOB: 1950   Date of Visit: 2/29/2024       To Whom It May Concern:    It is my medical opinion that Mal Lopez may return to work on 03-24-24 .    If you have any questions or concerns, please don't hesitate to call.         Sincerely,        Allen Almanza MD    CC: No Recipients

## 2024-02-29 NOTE — PROGRESS NOTES
History of Present Illness  No chief complaint on file.      The patient is status post side: right shoulder arthroscopy with a subacromial decompression, joint debridement, and rotator cuff repair repair.  Date of surgery December 6, 2023  they complain of mild pain.  They deny any numbness or tingling in the right upper extremity.  He notes he is still sleeping in a recliner and has difficulty laying in bed.  He will get pain laying down.  He also states he may be getting some early right olecranon bursitis however the area is soft with mild bogginess    Review of Systems   GENERAL: Negative for malaise, significant weight loss, fever  MUSCULOSKELETAL: see HPI  NEURO:  Negative    Exam  Portal sites are healing well.  There is no erythema, warmth or purulent drainage.  They have intact sensation along the lateral border of the upper arm.  They have intact thumb extension, wrist extension, finger flexion.  Range of motion:  Forward flexion to 150 degrees internal rotation to L1 external rotation 80 degrees abduction to 80 degrees with shoulder hike    Assessment  Status post side: right shoulder arthroscopy with subacromial decompression, joint debridement, and rotator cuff repair    Plan  Continue with physical therapy for range of motion and strengthening  Flexeril for night pain  Continue Advil as well as needed  According to the patient he is scheduled to return to work on 3/24/2024, we will allow him to do so  All questions were answered.

## 2024-03-26 ENCOUNTER — TELEPHONE (OUTPATIENT)
Dept: ORTHOPEDIC SURGERY | Facility: CLINIC | Age: 74
End: 2024-03-26
Payer: MEDICARE

## 2024-03-26 NOTE — TELEPHONE ENCOUNTER
Needs an updated note  for back to work,  pt is wanting him to push out for a couple more weeks, thinking return to work on 4/17.

## 2024-03-26 NOTE — LETTER
March 28, 2024     Patient: Mal Lopez   YOB: 1950   Date of Visit: 3/26/2024       To Whom It May Concern:    It is my medical opinion that Mal Lopez should remain out of work until 04-17-24 .    If you have any questions or concerns, please don't hesitate to call.         Sincerely,    Allen Almanza MD    CC: No Recipients

## 2024-03-26 NOTE — LETTER
March 28, 2024     Patient: Mal Lopez   YOB: 1950   Date of Visit: 3/26/2024       To Whom It May Concern:    Mal Lopez was seen in my clinic on 3/26/2024 at . Please excuse Mal for his absence from work on this day to make the appointment.    If you have any questions or concerns, please don't hesitate to call.         Sincerely,         Allen Almanza MD        CC: No Recipients

## 2024-04-24 ENCOUNTER — TELEPHONE (OUTPATIENT)
Dept: ORTHOPEDIC SURGERY | Facility: CLINIC | Age: 74
End: 2024-04-24
Payer: MEDICARE

## 2024-04-24 NOTE — LETTER
April 25, 2024     Patient: Mal Lopez   YOB: 1950   Date of Visit: 4/24/2024       To Whom It May Concern:    It is my medical opinion that Mal Lopez  may return to work as of 04-29-24 .    If you have any questions or concerns, please don't hesitate to call.         Sincerely,        Allen Almanza MD    CC: No Recipients

## 2024-04-24 NOTE — LETTER
April 25, 2024     Patient: Mal Lopez   YOB: 1950   Date of Visit: 4/24/2024       To Whom It May Concern:    It is my medical opinion that Mal Lopez  may return to work on 04-29-24.  He is to be allowed to work part time or full time as tolerated for the first six weeks of return .    If you have any questions or concerns, please don't hesitate to call.         Sincerely,        Allen Almanza MD    CC: No Recipients

## 2024-04-24 NOTE — TELEPHONE ENCOUNTER
Pt called and he would like a note to return  to work ASAP, wasn't sure of the restrictions if any, pt he wants in the note for him to  be aloud to work part time or full time as  tolerated.

## 2024-05-02 ENCOUNTER — OFFICE VISIT (OUTPATIENT)
Dept: ORTHOPEDIC SURGERY | Facility: CLINIC | Age: 74
End: 2024-05-02
Payer: MEDICARE

## 2024-05-02 DIAGNOSIS — Z98.890 S/P RIGHT ROTATOR CUFF REPAIR: Primary | ICD-10-CM

## 2024-05-02 PROCEDURE — 99213 OFFICE O/P EST LOW 20 MIN: CPT | Performed by: ORTHOPAEDIC SURGERY

## 2024-05-02 PROCEDURE — 1159F MED LIST DOCD IN RCRD: CPT | Performed by: ORTHOPAEDIC SURGERY

## 2024-05-02 NOTE — PROGRESS NOTES
History of Present Illness  No chief complaint on file.      The patient is status post side: right shoulder arthroscopy with a subacromial decompression, joint debridement, and rotator cuff repair repair.  Date of surgery December 6, 2023  they complain of mild pain.  They deny any numbness or tingling in the right upper extremity.    Review of Systems   GENERAL: Negative for malaise, significant weight loss, fever  MUSCULOSKELETAL: see HPI  NEURO:  Negative    Exam  Portal sites are healing well.  There is no erythema, warmth or purulent drainage.  They have intact sensation along the lateral border of the upper arm.  They have intact thumb extension, wrist extension, finger flexion.  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  He had also mention some elements of cervical radiculopathy with pain radiating down his arm and into his other shoulder across his thoracic spine.  We may refer him to our spine team in the future.  All questions were answered.

## 2024-07-15 ENCOUNTER — OFFICE VISIT (OUTPATIENT)
Dept: PRIMARY CARE | Facility: CLINIC | Age: 74
End: 2024-07-15
Payer: MEDICARE

## 2024-07-15 ENCOUNTER — HOSPITAL ENCOUNTER (OUTPATIENT)
Dept: RADIOLOGY | Facility: CLINIC | Age: 74
Discharge: HOME | End: 2024-07-15
Payer: MEDICARE

## 2024-07-15 VITALS
BODY MASS INDEX: 31.66 KG/M2 | WEIGHT: 227 LBS | DIASTOLIC BLOOD PRESSURE: 64 MMHG | TEMPERATURE: 97.8 F | SYSTOLIC BLOOD PRESSURE: 116 MMHG

## 2024-07-15 DIAGNOSIS — M79.661 RIGHT CALF PAIN: Primary | ICD-10-CM

## 2024-07-15 DIAGNOSIS — M25.561 RIGHT KNEE PAIN, UNSPECIFIED CHRONICITY: Primary | ICD-10-CM

## 2024-07-15 DIAGNOSIS — M79.661 RIGHT CALF PAIN: ICD-10-CM

## 2024-07-15 PROCEDURE — 1036F TOBACCO NON-USER: CPT | Performed by: FAMILY MEDICINE

## 2024-07-15 PROCEDURE — 1160F RVW MEDS BY RX/DR IN RCRD: CPT | Performed by: FAMILY MEDICINE

## 2024-07-15 PROCEDURE — 93971 EXTREMITY STUDY: CPT | Performed by: RADIOLOGY

## 2024-07-15 PROCEDURE — 99213 OFFICE O/P EST LOW 20 MIN: CPT | Performed by: FAMILY MEDICINE

## 2024-07-15 PROCEDURE — 1159F MED LIST DOCD IN RCRD: CPT | Performed by: FAMILY MEDICINE

## 2024-07-15 PROCEDURE — 93971 EXTREMITY STUDY: CPT

## 2024-07-15 NOTE — PROGRESS NOTES
Subjective   Patient ID: [id number]     Mal Lopez is a 74 y.o. male who presents for Knee Pain (Right knee pain, worsening in the last 2 weeks; felt a pop going up his stairs yesterday.).  HPI  He complains of right knee pain.  This has worsened in the last two weeks.  He felt a pop going up the stairs yesterday.      He has had knee pain for a long time for months.  It has been bad for two weeks.      He had an xray of the other (left) knee showing mod to severe DJD.      He just had right rotator cuff surgery in December.  His right shoulder is better.    He complains of more right calf pain than the knee pain.  It is causing him trouble walking.      Objective     /64 (BP Location: Right arm, Patient Position: Sitting)   Temp 36.6 °C (97.8 °F) (Temporal)   Wt 103 kg (227 lb)   BMI 31.66 kg/m²      Physical Exam  Musculoskeletal:      Right lower leg: No edema.      Left lower leg: No edema.      Comments: Left knee tender at the joint line.      He complains of calf pain in the right knee.  Tender at the gastrocnemius of or the right leg.  No edema.  No redness.   Neurological:      Mental Status: He is alert.         Assessment/Plan   Problem List Items Addressed This Visit    None  Visit Diagnoses       Right calf pain    -  Primary    Relevant Orders    Vascular US lower extremity venous duplex right        I will order an ultrasound to rule out a blood clot causing your right calf pain.  If this is negative then an orthopedic referral will be considered to evaluate your knee pain in both legs.      Bhupendra Yee,

## 2024-07-16 DIAGNOSIS — M17.9 OSTEOARTHRITIS OF KNEE, UNSPECIFIED LATERALITY, UNSPECIFIED OSTEOARTHRITIS TYPE: ICD-10-CM

## 2024-07-16 DIAGNOSIS — M25.562 CHRONIC PAIN OF LEFT KNEE: Primary | ICD-10-CM

## 2024-07-16 DIAGNOSIS — G89.29 CHRONIC PAIN OF LEFT KNEE: Primary | ICD-10-CM

## 2024-07-16 RX ORDER — CELECOXIB 100 MG/1
100 CAPSULE ORAL DAILY
Qty: 30 CAPSULE | Refills: 0 | Status: SHIPPED | OUTPATIENT
Start: 2024-07-16 | End: 2024-08-15

## 2024-07-17 NOTE — PROGRESS NOTES
History of Present Illness  Chief Complaint   Patient presents with    Left Knee - Follow-up     xray    Right Knee - Follow-up     New prob  xray       The patient presents with side: bilateral knee pain for 1 week.  The patient complains of worsening pain over the past 1 week.  Recently there has been concern for falls and instability.  There is increasing difficulty with activities of daily living and significant disability related to the knee pain.  The patient endorses the following non-operative treatments: Rest, ice, elevation and NSAIDS.   There is increasing frustration with persistent pain and swelling and decreasing distance of ambulation.  The patient has difficulty with stairs as well as getting up from the floor.  The patient has difficulty putting on their socks and shoes as well as getting in and out of a car.    He noted a posterior lower right extremity pain about a week ago where he had difficulty with ambulation as well as going up and down stairs and he resorted to using a cane.  He also noted some medial left knee pain.  He saw his primary care who ordered an ultrasound to rule out DVT which was negative and prescribed him Celebrex which she believes the Celebrex and activity modification is helping calm down his pain and he is able to ambulate much more freely now.    Past Medical History:   Diagnosis Date    Allergic rhinitis due to pollen     Hay fever    Anemia     COVID-19     VACCINATED    Deviated nasal septum     Hearing aid worn     Osteoarthritis     Personal history of other diseases of the respiratory system     Personal history of asthma    PONV (postoperative nausea and vomiting)     PATIENT STATED HAS LOW BLOOD PRESSURE IN THE PAST AFTER ANESTH.    Wears glasses        Medication Documentation Review Audit       Reviewed by Bhupendra Yee DO (Physician) on 07/15/24 at 1241      Medication Order Taking? Sig Documenting Provider Last Dose Status   cyclobenzaprine (Flexeril)  10 mg tablet [id number] No Take 1 tablet (10 mg) by mouth 3 times a day as needed for muscle spasms for up to 10 days.   Patient not taking: Reported on 7/15/2024    Allen Almanza MD Not Taking Flag for Review                    Allergies   Allergen Reactions    House Dust Other     SNEEZING    Penicillins Dizziness       Social History     Socioeconomic History    Marital status:      Spouse name: Not on file    Number of children: Not on file    Years of education: Not on file    Highest education level: Not on file   Occupational History    Not on file   Tobacco Use    Smoking status: Never    Smokeless tobacco: Never   Vaping Use    Vaping status: Never Used   Substance and Sexual Activity    Alcohol use: Not Currently     Comment: RARELY    Drug use: Defer    Sexual activity: Yes     Partners: Female   Other Topics Concern    Not on file   Social History Narrative    Not on file     Social Determinants of Health     Financial Resource Strain: Not on file   Food Insecurity: Not on file   Transportation Needs: Not on file   Physical Activity: Not on file   Stress: Not on file   Social Connections: Not on file   Intimate Partner Violence: Not on file   Housing Stability: Not on file       Past Surgical History:   Procedure Laterality Date    SEPTOPLASTY      DEVIATED SEPTAL NASAL REPAIR    TONSILLECTOMY         BMI  31    Pain is described as aching and sore    Location: Medial left knee and posterior lower right extremity  Pain level: 3  Assistive device: is not using any ambulatory assistive devices  History of surgery: No       Review of Systems   GENERAL: Negative for malaise, significant weight loss, fever  MUSCULOSKELETAL: see HPI  NEURO:  Negative     Exam  side: bilateral Knee:  Skin healthy and intact  No gross swelling or ecchymosis  Alignment: mild varus    Correctable: full     Effusion:  no       ROM: 0 and 120  mild Crepitus with range of motion  Tenderness to palpation over medial and lateral  joint line and with patellar compression      No laxity to valgus stress  No laxity to varus stress  Negative Lachman´s test  Negative posterior drawer test  negative Reanna´s test  Logrolling of hip negative  No pain with internal rotation of the hip  Straight leg raise negative  Neurovascular exam normal distally  2+ DP pulse and good cap refill     Radiographs  XR knee 4+ views bilateral  Interpreted By:  Allen Almanza,   STUDY:  XR KNEE 4+ VIEWS BILATERAL; ; 7/18/2024 10:47 am      INDICATION:  Signs/Symptoms:pain.      ACCESSION NUMBER(S):  [id number]      ORDERING CLINICIAN:  ALLEN ALMANZA      FINDINGS:  Bilateral knee films are negative for fracture, dislocation or  destructive lesion. There is severe tricompartmental arthrosis of the  left knee with loss of joint space and spurring. There is significant  involvement of the medial compartment. There is an osteochondral  defect seen in the medial femoral condyle. The right knee shows  moderate tricompartmental degenerative change with joint space  narrowing and spurring.          Signed by: Allen Almanza 7/18/2024 12:03 PM  Dictation workstation:   [id number]         Assessment  Osteoarthrosis side: bilateral knee      Plan  We discussed with the patient the diagnosis of degenerative joint disease of the knee.  We reviewed an evidence-based approach to osteoarthritis of the knee.  We strongly encouraged low-impact aerobic activity and non-opioid analgesics.     We discussed temporary pain relief with corticosteroid injections and the associated risks.  We also discussed the conflicting evidence regarding viscosupplementation and potential long-term risks with NSAID´s.  We reviewed the role of bracing for instability and physical therapy for atrophy and gait abnormalities.  We reviewed that the only curative process is for a joint arthroplasty.  The patient elected for continue with Celebrex and activity modification as this seems to be working.  We also  discussed in the future we could trial a corticosteroid and or gel injections.  We also discussed that ultimately the only treatment for arthritis is joint replacement surgery.    I will see them in 1 month for recheck, sooner if there is any problems.  Questions were answered.

## 2024-07-18 ENCOUNTER — OFFICE VISIT (OUTPATIENT)
Dept: ORTHOPEDIC SURGERY | Facility: CLINIC | Age: 74
End: 2024-07-18
Payer: MEDICARE

## 2024-07-18 ENCOUNTER — HOSPITAL ENCOUNTER (OUTPATIENT)
Dept: RADIOLOGY | Facility: CLINIC | Age: 74
Discharge: HOME | End: 2024-07-18
Payer: MEDICARE

## 2024-07-18 DIAGNOSIS — M17.0 BILATERAL PRIMARY OSTEOARTHRITIS OF KNEE: Primary | ICD-10-CM

## 2024-07-18 DIAGNOSIS — M17.0 BILATERAL PRIMARY OSTEOARTHRITIS OF KNEE: ICD-10-CM

## 2024-07-18 PROCEDURE — 99213 OFFICE O/P EST LOW 20 MIN: CPT | Performed by: ORTHOPAEDIC SURGERY

## 2024-07-18 PROCEDURE — 73564 X-RAY EXAM KNEE 4 OR MORE: CPT | Mod: 50

## 2024-07-18 PROCEDURE — 73564 X-RAY EXAM KNEE 4 OR MORE: CPT | Mod: BILATERAL PROCEDURE | Performed by: ORTHOPAEDIC SURGERY

## 2024-07-31 ENCOUNTER — APPOINTMENT (OUTPATIENT)
Dept: ORTHOPEDIC SURGERY | Facility: CLINIC | Age: 74
End: 2024-07-31
Payer: MEDICARE

## 2024-08-05 ENCOUNTER — TELEPHONE (OUTPATIENT)
Dept: ORTHOPEDIC SURGERY | Facility: CLINIC | Age: 74
End: 2024-08-05
Payer: MEDICARE

## 2024-08-05 NOTE — TELEPHONE ENCOUNTER
WOULD LIKE TO TALK WITH DR ONE OR LIZZETTE .PATIENT HAS BEEN USING CELEBREX FOR HIS KNEE, AND IT IS NOT WORKING WOULD LIKE A CALL     THANKS

## 2024-08-06 NOTE — TELEPHONE ENCOUNTER
Patient was called and a VM was left for him to call the office as he needs to be seen for follow up to discuss the next step in his plan of care.

## 2024-08-07 NOTE — PROGRESS NOTES
History of Present Illness  No chief complaint on file.      Patient with known osteoarthritis of the side: bilateral knee who presents today for repeat evaluation.  The patient notes worsening knee pain.  The patient notes worsening mechanical symptoms.  The patient has tried the following modalities NSAIDS.      Past Medical History:   Diagnosis Date    Allergic rhinitis due to pollen     Hay fever    Anemia     COVID-19     VACCINATED    Deviated nasal septum     Hearing aid worn     Osteoarthritis     Personal history of other diseases of the respiratory system     Personal history of asthma    PONV (postoperative nausea and vomiting)     PATIENT STATED HAS LOW BLOOD PRESSURE IN THE PAST AFTER ANESTH.    Wears glasses        Medication Documentation Review Audit       Reviewed by Bhupendra Yee DO (Physician) on 07/15/24 at 1241      Medication Order Taking? Sig Documenting Provider Last Dose Status   cyclobenzaprine (Flexeril) 10 mg tablet [id number] No Take 1 tablet (10 mg) by mouth 3 times a day as needed for muscle spasms for up to 10 days.   Patient not taking: Reported on 7/15/2024    Allen Almanza MD Not Taking Flag for Review                    Allergies   Allergen Reactions    House Dust Other     SNEEZING    Penicillins Dizziness       Social History     Socioeconomic History    Marital status:      Spouse name: Not on file    Number of children: Not on file    Years of education: Not on file    Highest education level: Not on file   Occupational History    Not on file   Tobacco Use    Smoking status: Never    Smokeless tobacco: Never   Vaping Use    Vaping status: Never Used   Substance and Sexual Activity    Alcohol use: Not Currently     Comment: RARELY    Drug use: Defer    Sexual activity: Yes     Partners: Female   Other Topics Concern    Not on file   Social History Narrative    Not on file     Social Determinants of Health     Financial Resource Strain: Not on file   Food  Insecurity: Not on file   Transportation Needs: Not on file   Physical Activity: Not on file   Stress: Not on file   Social Connections: Not on file   Intimate Partner Violence: Not on file   Housing Stability: Not on file       Past Surgical History:   Procedure Laterality Date    SEPTOPLASTY      DEVIATED SEPTAL NASAL REPAIR    TONSILLECTOMY              Review of Systems   GENERAL: Negative for malaise, significant weight loss, fever  MUSCULOSKELETAL: see HPI  NEURO:  Negative    BMI  28    Exam  side: bilateral Knee:  Skin healthy and intact  No gross swelling or ecchymosis  Alignment: mild varus  Correctable: full  Effusion: moderate  ROM: normal  Crepitance with range of motion  No pain with internal rotation of the hip  Tenderness to palpation: medial Pain with patellar compression: No  No laxity to valgus stress  No laxity to varus stress  Negative Lachman´s test  Negative posterior drawer test  negative Reanna´s test     Neurovascular exam normal distally  2+ DP pulse and good cap refill     Imaging  XR knee 4+ views bilateral  Interpreted By:  Allen Almanza,   STUDY:  XR KNEE 4+ VIEWS BILATERAL; ; 7/18/2024 10:47 am      INDICATION:  Signs/Symptoms:pain.      ACCESSION NUMBER(S):  [id number]      ORDERING CLINICIAN:  ALLEN ALMANZA      FINDINGS:  Bilateral knee films are negative for fracture, dislocation or  destructive lesion. There is severe tricompartmental arthrosis of the  left knee with loss of joint space and spurring. There is significant  involvement of the medial compartment. There is an osteochondral  defect seen in the medial femoral condyle. The right knee shows  moderate tricompartmental degenerative change with joint space  narrowing and spurring.          Signed by: Allen Almanza 7/18/2024 12:03 PM  Dictation workstation:   [id number]         Assessment  Patient with known osteoarthritis of the side: bilateral knee     Plan  We reviewed an evidence-based approach to OA of the knee.  We  discussed past treatments as well options for future treatment.  L Inj/Asp: bilateral knee on 8/8/2024 8:59 AM  Indications: pain  Details: 21 G needle, anterolateral approach  Medications (Right): 1 mL lidocaine 10 mg/mL (1 %); 1 mL betamethasone acet,sod phos 6 mg/mL  Medications (Left): 1 mL lidocaine 10 mg/mL (1 %); 1 mL betamethasone acet,sod phos 6 mg/mL  Outcome: tolerated well, no immediate complications  Procedure, treatment alternatives, risks and benefits explained, specific risks discussed. Consent was given by the patient. Immediately prior to procedure a time out was called to verify the correct patient, procedure, equipment, support staff and site/side marked as required. Patient was prepped and draped in the usual sterile fashion.         Follow up in 1 month for re-evaluation  All questions answered

## 2024-08-08 ENCOUNTER — OFFICE VISIT (OUTPATIENT)
Dept: ORTHOPEDIC SURGERY | Facility: CLINIC | Age: 74
End: 2024-08-08
Payer: MEDICARE

## 2024-08-08 VITALS — BODY MASS INDEX: 28.44 KG/M2 | HEIGHT: 72 IN | WEIGHT: 210 LBS

## 2024-08-08 DIAGNOSIS — M17.0 BILATERAL PRIMARY OSTEOARTHRITIS OF KNEE: Primary | ICD-10-CM

## 2024-08-08 PROCEDURE — 2500000004 HC RX 250 GENERAL PHARMACY W/ HCPCS (ALT 636 FOR OP/ED): Performed by: ORTHOPAEDIC SURGERY

## 2024-08-08 PROCEDURE — 99213 OFFICE O/P EST LOW 20 MIN: CPT | Mod: 50 | Performed by: ORTHOPAEDIC SURGERY

## 2024-08-08 PROCEDURE — 3008F BODY MASS INDEX DOCD: CPT | Performed by: ORTHOPAEDIC SURGERY

## 2024-08-08 PROCEDURE — 20610 DRAIN/INJ JOINT/BURSA W/O US: CPT | Mod: 50 | Performed by: ORTHOPAEDIC SURGERY

## 2024-08-08 PROCEDURE — 99213 OFFICE O/P EST LOW 20 MIN: CPT | Performed by: ORTHOPAEDIC SURGERY

## 2024-08-08 PROCEDURE — 1036F TOBACCO NON-USER: CPT | Performed by: ORTHOPAEDIC SURGERY

## 2024-08-08 PROCEDURE — 2500000005 HC RX 250 GENERAL PHARMACY W/O HCPCS: Performed by: ORTHOPAEDIC SURGERY

## 2024-08-08 PROCEDURE — 1159F MED LIST DOCD IN RCRD: CPT | Performed by: ORTHOPAEDIC SURGERY

## 2024-08-08 PROCEDURE — 20610 DRAIN/INJ JOINT/BURSA W/O US: CPT | Performed by: ORTHOPAEDIC SURGERY

## 2024-08-08 RX ORDER — BETAMETHASONE SODIUM PHOSPHATE AND BETAMETHASONE ACETATE 3; 3 MG/ML; MG/ML
1 INJECTION, SUSPENSION INTRA-ARTICULAR; INTRALESIONAL; INTRAMUSCULAR; SOFT TISSUE
Status: COMPLETED | OUTPATIENT
Start: 2024-08-08 | End: 2024-08-08

## 2024-08-08 RX ORDER — LIDOCAINE HYDROCHLORIDE 10 MG/ML
1 INJECTION INFILTRATION; PERINEURAL
Status: COMPLETED | OUTPATIENT
Start: 2024-08-08 | End: 2024-08-08

## 2024-08-21 ENCOUNTER — APPOINTMENT (OUTPATIENT)
Dept: OTOLARYNGOLOGY | Facility: CLINIC | Age: 74
End: 2024-08-21
Payer: MEDICARE

## 2024-08-30 ENCOUNTER — OFFICE VISIT (OUTPATIENT)
Dept: ORTHOPEDIC SURGERY | Facility: CLINIC | Age: 74
End: 2024-08-30
Payer: MEDICARE

## 2024-08-30 DIAGNOSIS — M17.0 BILATERAL PRIMARY OSTEOARTHRITIS OF KNEE: Primary | ICD-10-CM

## 2024-08-30 PROCEDURE — 99214 OFFICE O/P EST MOD 30 MIN: CPT | Performed by: ORTHOPAEDIC SURGERY

## 2024-08-30 RX ORDER — IBUPROFEN 800 MG/1
800 TABLET ORAL 3 TIMES DAILY
Qty: 90 TABLET | Refills: 0 | Status: SHIPPED | OUTPATIENT
Start: 2024-08-30 | End: 2024-09-29

## 2024-09-19 ENCOUNTER — APPOINTMENT (OUTPATIENT)
Dept: ORTHOPEDIC SURGERY | Facility: CLINIC | Age: 74
End: 2024-09-19
Payer: MEDICARE

## 2024-09-19 NOTE — PROGRESS NOTES
History of Present Illness   Bilateral knee Euflexxa injection 1 of 3.     Review of Systems   GENERAL: Negative for malaise, significant weight loss, fever  MUSCULOSKELETAL: see HPI  NEURO:  Negative     Past Medical History:   Diagnosis Date    Allergic rhinitis due to pollen     Hay fever    Anemia     COVID-19     VACCINATED    Deviated nasal septum     Hearing aid worn     Osteoarthritis     Personal history of other diseases of the respiratory system     Personal history of asthma    PONV (postoperative nausea and vomiting)     PATIENT STATED HAS LOW BLOOD PRESSURE IN THE PAST AFTER ANESTH.    Wears glasses         Medication Documentation Review Audit       Reviewed by Debbie Baca MA (Medical Assistant) on 08/08/24 at 0852      Medication Order Taking? Sig Documenting Provider Last Dose Status   celecoxib (CeleBREX) 100 mg capsule [id number]  Take 1 capsule (100 mg) by mouth once daily. Bhupendra Yee,   Active                     Physical Exam  Bilateral knees  Skin is intact without any evidence of erythema ecchymosis or effusion.     Imaging  None today     Assessment   Bilateral knee osteoarthritis     Plan  Bilateral knee Euflexxa injection 1 of 3.  Follow-up next week for injection 2 of 3.  All questions answered.    L Inj/Asp: bilateral knee on 9/20/2024 9:30 AM  Indications: pain  Details: 20 G needle, anterolateral approach  Medications (Right): 20 mg sodium hyaluronate 10 mg/mL(mw 2.4 -3.6 million)  Medications (Left): 20 mg sodium hyaluronate 10 mg/mL(mw 2.4 -3.6 million)  Outcome: tolerated well, no immediate complications  Procedure, treatment alternatives, risks and benefits explained, specific risks discussed. Consent was given by the patient. Immediately prior to procedure a time out was called to verify the correct patient, procedure, equipment, support staff and site/side marked as required. Patient was prepped and draped in the usual sterile fashion.

## 2024-09-20 ENCOUNTER — OFFICE VISIT (OUTPATIENT)
Dept: ORTHOPEDIC SURGERY | Facility: CLINIC | Age: 74
End: 2024-09-20
Payer: MEDICARE

## 2024-09-20 DIAGNOSIS — M17.0 BILATERAL PRIMARY OSTEOARTHRITIS OF KNEE: Primary | ICD-10-CM

## 2024-09-20 PROCEDURE — 20610 DRAIN/INJ JOINT/BURSA W/O US: CPT | Mod: 50 | Performed by: ORTHOPAEDIC SURGERY

## 2024-09-20 PROCEDURE — 2500000004 HC RX 250 GENERAL PHARMACY W/ HCPCS (ALT 636 FOR OP/ED): Mod: JZ | Performed by: ORTHOPAEDIC SURGERY

## 2024-09-20 PROCEDURE — 99211 OFF/OP EST MAY X REQ PHY/QHP: CPT | Mod: 25 | Performed by: ORTHOPAEDIC SURGERY

## 2024-09-26 NOTE — PROGRESS NOTES
History of Present Illness   Bilateral knee euflexxa injection 2 of 3     Review of Systems   GENERAL: Negative for malaise, significant weight loss, fever  MUSCULOSKELETAL: see HPI  NEURO:  Negative     Past Medical History:   Diagnosis Date    Allergic rhinitis due to pollen     Hay fever    Anemia     COVID-19     VACCINATED    Deviated nasal septum     Hearing aid worn     Osteoarthritis     Personal history of other diseases of the respiratory system     Personal history of asthma    PONV (postoperative nausea and vomiting)     PATIENT STATED HAS LOW BLOOD PRESSURE IN THE PAST AFTER ANESTH.    Wears glasses         Medication Documentation Review Audit       Reviewed by Debbie Baca MA (Medical Assistant) on 08/08/24 at 0852      Medication Order Taking? Sig Documenting Provider Last Dose Status   celecoxib (CeleBREX) 100 mg capsule [id number]  Take 1 capsule (100 mg) by mouth once daily. Bhupendra Yee,   Active                     Physical Exam  Bilateral knees  Skin is intact without any evidence of erythema ecchymosis or effusion     Imaging  XR knee 4+ views bilateral  Interpreted By:  Allen Almanza,   STUDY:  XR KNEE 4+ VIEWS BILATERAL; ; 7/18/2024 10:47 am      INDICATION:  Signs/Symptoms:pain.      ACCESSION NUMBER(S):  [id number]      ORDERING CLINICIAN:  ALLEN ALMANZA      FINDINGS:  Bilateral knee films are negative for fracture, dislocation or  destructive lesion. There is severe tricompartmental arthrosis of the  left knee with loss of joint space and spurring. There is significant  involvement of the medial compartment. There is an osteochondral  defect seen in the medial femoral condyle. The right knee shows  moderate tricompartmental degenerative change with joint space  narrowing and spurring.          Signed by: Allne Almanza 7/18/2024 12:03 PM  Dictation workstation:   [id number]       Assessment   Bilateral knee osteoarthritis     Plan  Bilateral knee euflexxa injection 2 of  3  Follow up next week for injection 3 of 3   All questions answered    L Inj/Asp: bilateral knee on 9/27/2024 9:26 AM  Indications: pain  Details: 20 G needle, anterolateral approach  Medications (Right): 20 mg sodium hyaluronate 10 mg/mL(mw 2.4 -3.6 million)  Medications (Left): 20 mg sodium hyaluronate 10 mg/mL(mw 2.4 -3.6 million)  Outcome: tolerated well, no immediate complications  Procedure, treatment alternatives, risks and benefits explained, specific risks discussed. Consent was given by the patient. Immediately prior to procedure a time out was called to verify the correct patient, procedure, equipment, support staff and site/side marked as required. Patient was prepped and draped in the usual sterile fashion.

## 2024-09-27 ENCOUNTER — OFFICE VISIT (OUTPATIENT)
Dept: ORTHOPEDIC SURGERY | Facility: CLINIC | Age: 74
End: 2024-09-27
Payer: MEDICARE

## 2024-09-27 DIAGNOSIS — M17.0 BILATERAL PRIMARY OSTEOARTHRITIS OF KNEE: Primary | ICD-10-CM

## 2024-09-27 PROCEDURE — 99211 OFF/OP EST MAY X REQ PHY/QHP: CPT | Mod: 50 | Performed by: ORTHOPAEDIC SURGERY

## 2024-09-27 PROCEDURE — 20610 DRAIN/INJ JOINT/BURSA W/O US: CPT | Mod: 50 | Performed by: ORTHOPAEDIC SURGERY

## 2024-10-03 NOTE — PROGRESS NOTES
History of Present Illness   The patient is here for bilateral Euflexxa injections     Review of Systems   GENERAL: Negative for malaise, significant weight loss, fever  MUSCULOSKELETAL: see HPI  NEURO:  Negative     Past Medical History:   Diagnosis Date    Allergic rhinitis due to pollen     Hay fever    Anemia     COVID-19     VACCINATED    Deviated nasal septum     Hearing aid worn     Osteoarthritis     Personal history of other diseases of the respiratory system     Personal history of asthma    PONV (postoperative nausea and vomiting)     PATIENT STATED HAS LOW BLOOD PRESSURE IN THE PAST AFTER ANESTH.    Wears glasses         Medication Documentation Review Audit       Reviewed by Rupinder Clements MA (Medical Assistant) on 09/27/24 at 0915      Medication Order Taking? Sig Documenting Provider Last Dose Status   ibuprofen 800 mg tablet [id number]  Take 1 tablet (800 mg) by mouth 3 times a day. Allen Almanza MD  Active                     Physical Exam  This is a male in no acute distress  Bilateral knees:  The skin is intact, no erythema or warmth     Imaging  XR knee 4+ views bilateral  Interpreted By:  Allen Almanza,   STUDY:  XR KNEE 4+ VIEWS BILATERAL; ; 7/18/2024 10:47 am      INDICATION:  Signs/Symptoms:pain.      ACCESSION NUMBER(S):  [id number]      ORDERING CLINICIAN:  ALLEN ALMANZA      FINDINGS:  Bilateral knee films are negative for fracture, dislocation or  destructive lesion. There is severe tricompartmental arthrosis of the  left knee with loss of joint space and spurring. There is significant  involvement of the medial compartment. There is an osteochondral  defect seen in the medial femoral condyle. The right knee shows  moderate tricompartmental degenerative change with joint space  narrowing and spurring.          Signed by: Allen Almanza 7/18/2024 12:03 PM  Dictation workstation:   [id number]       Assessment   Bilateral knee osteoarthrosis     Plan  Bilateral Euflexxa  injections  Follow-up in 2 months for recheck  All questions answered    L Inj/Asp: bilateral knee on 10/4/2024 9:36 AM  Indications: pain  Details: 20 G needle, anterolateral approach  Medications (Right): 20 mg sodium hyaluronate 10 mg/mL(mw 2.4 -3.6 million)  Medications (Left): 20 mg sodium hyaluronate 10 mg/mL(mw 2.4 -3.6 million)  Outcome: tolerated well, no immediate complications  Procedure, treatment alternatives, risks and benefits explained, specific risks discussed. Consent was given by the patient. Immediately prior to procedure a time out was called to verify the correct patient, procedure, equipment, support staff and site/side marked as required. Patient was prepped and draped in the usual sterile fashion.           [FreeTextEntry1] : Nicolás is a 19 years old male who presents with his father for evaluation of left knee pain for past 2 weeks. Patient is an active  and reports that about 6 months ago he started complaining of bilateral knee pain. He was seen by orthopaedic at that time and was diagnosed with Osgood Schlatter disease. He was recommended activity modification and rest. The pain improved and he resumed playing basketball. However, about 2 weeks he started experiencing left knee pain located at tibial tuberosity. Denies any injury, fall or trauma. He has been applying ice and taking Advil as needed. He is here for orthopaedic evaluation and management.

## 2024-10-04 ENCOUNTER — OFFICE VISIT (OUTPATIENT)
Dept: ORTHOPEDIC SURGERY | Facility: CLINIC | Age: 74
End: 2024-10-04
Payer: MEDICARE

## 2024-10-04 DIAGNOSIS — M17.0 BILATERAL PRIMARY OSTEOARTHRITIS OF KNEE: Primary | ICD-10-CM

## 2024-10-04 PROCEDURE — 99211 OFF/OP EST MAY X REQ PHY/QHP: CPT | Mod: 50 | Performed by: ORTHOPAEDIC SURGERY

## 2024-10-04 PROCEDURE — 20610 DRAIN/INJ JOINT/BURSA W/O US: CPT | Mod: 50 | Performed by: ORTHOPAEDIC SURGERY

## 2024-10-04 PROCEDURE — 2500000004 HC RX 250 GENERAL PHARMACY W/ HCPCS (ALT 636 FOR OP/ED): Mod: JZ | Performed by: ORTHOPAEDIC SURGERY

## 2024-12-04 NOTE — PROGRESS NOTES
History of Present Illness  No chief complaint on file.      Patient returns today for follow up after a side: bilateral knee viscosupplementation injection. The patient is endorsing relief of their knee pain.    He notes about 40% relief from the injections    Review of Systems   GENERAL: Negative for malaise, significant weight loss, fever  MUSCULOSKELETAL: see HPI  NEURO:  Negative    side: bilateral Knee Exam  Skin is intact without any evidence of erythema or ecchymosis   mild effusion  There is not tenderness over medial and lateral joint line    Assessment:  Patient with known osteoarthritis of the side: bilateral knee    Plan:  He will continue to ride out relief from the viscosupplementation injections.  He will follow-up as needed in the future, sooner if there are any issues.  All questions answered.

## 2024-12-05 ENCOUNTER — OFFICE VISIT (OUTPATIENT)
Dept: ORTHOPEDIC SURGERY | Facility: CLINIC | Age: 74
End: 2024-12-05
Payer: MEDICARE

## 2024-12-05 VITALS — WEIGHT: 200 LBS | BODY MASS INDEX: 28.63 KG/M2 | HEIGHT: 70 IN

## 2024-12-05 DIAGNOSIS — M17.0 BILATERAL PRIMARY OSTEOARTHRITIS OF KNEE: Primary | ICD-10-CM

## 2024-12-05 PROCEDURE — 99212 OFFICE O/P EST SF 10 MIN: CPT | Performed by: ORTHOPAEDIC SURGERY

## 2024-12-05 PROCEDURE — 3008F BODY MASS INDEX DOCD: CPT | Performed by: ORTHOPAEDIC SURGERY

## 2024-12-05 PROCEDURE — 99213 OFFICE O/P EST LOW 20 MIN: CPT | Performed by: ORTHOPAEDIC SURGERY

## 2024-12-05 PROCEDURE — 1159F MED LIST DOCD IN RCRD: CPT | Performed by: ORTHOPAEDIC SURGERY

## 2025-03-07 NOTE — PROGRESS NOTES
Subjective   Patient ID: Mal Lopez is a 74 y.o. male who presents for Medicare Annual Wellness Visit Subsequent, Hyperlipidemia, and Sinus Problem.  HPI    Patient presents in the office today to establish care. Patient was seeing Dr. Yee. Patient does not go to Dr. Yee anymore due to feeling unheard. Patient heard about the practice through referral from a PA.      Patient presents today for AWV and HLD follow up. He does not eat a low cholesterol diet. He does exercise. Last eye exam was 2 months ago. Last dental exam was 6 months ago. Is fasting for BW today.    Patient presents in office for sinus infection. Ongoing x about two weeks. Symptoms include green mucus, congestion, mild cough, and sinus pressure. Has tried sinus medication. Admits relief. Has a h/o feviated septum surgery.    He is overall healthy and does not need any regular medication. He states he lost a lot of weight but again gaining it back. Denies heart burn or acid reflux. He has no h/o stroke, heart attack, or cancer.    Patient has osteoporosis of the knees. He also has a history of right shoulder repair surgery. He takes Ibuprofen intermittently for pain relief.     He has a history of elevated blood sugar. Last A1c on 2/25/22 was 5.8% and glucose on 11/27/23 was 113.    Up-to-date on pneumonia vaccine.      Review of systems  ; Patient seen today for exam denies any problems with headaches or vision, denies any shortness of breath chest pain nausea or vomiting, no black stool no blood in the stool no heartburn type symptoms denies any problems with constipation or diarrhea, and no dysuria-type symptoms    The patient's allergies medications were reviewed with them today    The patient's social family and surgical history or also reviewed here today, along with her past medical history.     Objective     Alert and active in  no acute distress  HEENT TMs clear oropharynx negative nares clear no drainage noted neck  supple  With no adenopathy   Heart regular rate and rhythm without murmur and no carotid bruits  Lungs- clear to auscultation bilaterally, no wheeze or rhonchi noted  Thyroid -negative masses or nodularity  Abdomen- soft times four quadrants , bowel sounds positive no masses or organomegaly, negative tenderness guarding or rebound  Neurological exam unremarkable- DTRs in upper and lower extremities within normal limits.   skin -no lesions noted      /80 (BP Location: Right arm, Patient Position: Sitting, BP Cuff Size: Adult)   Pulse 66   Temp 36.6 °C (97.8 °F) (Temporal)   Ht 1.829 m (6')   Wt 99.4 kg (219 lb 1.6 oz)   SpO2 95%   BMI 29.72 kg/m²     Allergies   Allergen Reactions    House Dust Other     SNEEZING    Penicillins Dizziness       Assessment/Plan   Problem List Items Addressed This Visit       Anemia    Relevant Orders    CBC and Auto Differential    Hyperlipidemia    Relevant Orders    Lipid Panel     Other Visit Diagnoses       Medicare annual wellness visit, subsequent    -  Primary    Routine general medical examination at a health care facility        Relevant Orders    Prostate Specific Antigen, Screen    Comprehensive Metabolic Panel    Hemoglobin A1C    Sinusitis, unspecified chronicity, unspecified location        Relevant Medications    clindamycin (Cleocin) 300 mg capsule    BMI 29.0-29.9,adult        Osteoporosis with current pathological fracture, unspecified osteoporosis type, initial encounter        Knees    Hyperglycemia                Patient is overall healthy.     Clindamycin 300 mg prescribed today.  Recommended dental exam for possible cavity.    Advised to take Ibuprofen with food to avoid ulcer; never take at bedtime.     Importance of healthy diet and regular exercise regimen discussed.  More apples, vegetables, chicken, fish, and less beef in diet.   Recommended to reduce bread, pasta, potato, rice, salt, and sugar.    Health maintenance discussed.   Recommended to  check PSA yearly.  Recommended Flu and [place] vaccines.    Labs have been ordered, she/he will have these performed and we will contact her/him with results.  (CBC, CMP, Lipid, A1c, PSA)    If anything worsens or changes please call us at once, follow up in the office as planned,       Scribe Attestation  By signing my name below, INati, Scribe   attest that this documentation has been prepared under the direction and in the presence of Marco Antonio Suero DO.

## 2025-03-10 ENCOUNTER — APPOINTMENT (OUTPATIENT)
Dept: PRIMARY CARE | Facility: CLINIC | Age: 75
End: 2025-03-10
Payer: MEDICARE

## 2025-03-10 VITALS
TEMPERATURE: 97.8 F | HEIGHT: 72 IN | DIASTOLIC BLOOD PRESSURE: 80 MMHG | SYSTOLIC BLOOD PRESSURE: 124 MMHG | BODY MASS INDEX: 29.68 KG/M2 | HEART RATE: 66 BPM | WEIGHT: 219.1 LBS | OXYGEN SATURATION: 95 %

## 2025-03-10 DIAGNOSIS — D50.8 OTHER IRON DEFICIENCY ANEMIA: ICD-10-CM

## 2025-03-10 DIAGNOSIS — Z00.00 MEDICARE ANNUAL WELLNESS VISIT, SUBSEQUENT: Primary | ICD-10-CM

## 2025-03-10 DIAGNOSIS — R73.9 HYPERGLYCEMIA: ICD-10-CM

## 2025-03-10 DIAGNOSIS — M80.00XA OSTEOPOROSIS WITH CURRENT PATHOLOGICAL FRACTURE, UNSPECIFIED OSTEOPOROSIS TYPE, INITIAL ENCOUNTER: ICD-10-CM

## 2025-03-10 DIAGNOSIS — J32.9 SINUSITIS, UNSPECIFIED CHRONICITY, UNSPECIFIED LOCATION: ICD-10-CM

## 2025-03-10 DIAGNOSIS — Z00.00 ROUTINE GENERAL MEDICAL EXAMINATION AT A HEALTH CARE FACILITY: ICD-10-CM

## 2025-03-10 DIAGNOSIS — E78.2 MODERATE MIXED HYPERLIPIDEMIA NOT REQUIRING STATIN THERAPY: ICD-10-CM

## 2025-03-10 PROCEDURE — 1170F FXNL STATUS ASSESSED: CPT | Performed by: FAMILY MEDICINE

## 2025-03-10 PROCEDURE — 3008F BODY MASS INDEX DOCD: CPT | Performed by: FAMILY MEDICINE

## 2025-03-10 PROCEDURE — 1159F MED LIST DOCD IN RCRD: CPT | Performed by: FAMILY MEDICINE

## 2025-03-10 PROCEDURE — G0439 PPPS, SUBSEQ VISIT: HCPCS | Performed by: FAMILY MEDICINE

## 2025-03-10 PROCEDURE — 1036F TOBACCO NON-USER: CPT | Performed by: FAMILY MEDICINE

## 2025-03-10 PROCEDURE — 99213 OFFICE O/P EST LOW 20 MIN: CPT | Performed by: FAMILY MEDICINE

## 2025-03-10 PROCEDURE — 1123F ACP DISCUSS/DSCN MKR DOCD: CPT | Performed by: FAMILY MEDICINE

## 2025-03-10 RX ORDER — CLINDAMYCIN HYDROCHLORIDE 300 MG/1
300 CAPSULE ORAL 3 TIMES DAILY
Qty: 30 CAPSULE | Refills: 0 | Status: SHIPPED | OUTPATIENT
Start: 2025-03-10 | End: 2025-03-20

## 2025-03-10 ASSESSMENT — ACTIVITIES OF DAILY LIVING (ADL)
GROCERY_SHOPPING: INDEPENDENT
DRESSING: INDEPENDENT
MANAGING_FINANCES: INDEPENDENT
TAKING_MEDICATION: INDEPENDENT
BATHING: INDEPENDENT
DOING_HOUSEWORK: INDEPENDENT

## 2025-03-15 LAB
ALBUMIN SERPL-MCNC: 4 G/DL (ref 3.6–5.1)
ALP SERPL-CCNC: 72 U/L (ref 35–144)
ALT SERPL-CCNC: 15 U/L (ref 9–46)
ANION GAP SERPL CALCULATED.4IONS-SCNC: 7 MMOL/L (CALC) (ref 7–17)
AST SERPL-CCNC: 18 U/L (ref 10–35)
BASOPHILS # BLD AUTO: 28 CELLS/UL (ref 0–200)
BASOPHILS NFR BLD AUTO: 0.5 %
BILIRUB SERPL-MCNC: 0.5 MG/DL (ref 0.2–1.2)
BUN SERPL-MCNC: 15 MG/DL (ref 7–25)
CALCIUM SERPL-MCNC: 8.8 MG/DL (ref 8.6–10.3)
CHLORIDE SERPL-SCNC: 104 MMOL/L (ref 98–110)
CHOLEST SERPL-MCNC: 225 MG/DL
CHOLEST/HDLC SERPL: 4.8 (CALC)
CO2 SERPL-SCNC: 29 MMOL/L (ref 20–32)
CREAT SERPL-MCNC: 0.74 MG/DL (ref 0.7–1.28)
EGFRCR SERPLBLD CKD-EPI 2021: 96 ML/MIN/1.73M2
EOSINOPHIL # BLD AUTO: 149 CELLS/UL (ref 15–500)
EOSINOPHIL NFR BLD AUTO: 2.7 %
ERYTHROCYTE [DISTWIDTH] IN BLOOD BY AUTOMATED COUNT: 12.7 % (ref 11–15)
EST. AVERAGE GLUCOSE BLD GHB EST-MCNC: 123 MG/DL
EST. AVERAGE GLUCOSE BLD GHB EST-SCNC: 6.8 MMOL/L
GLUCOSE SERPL-MCNC: 104 MG/DL (ref 65–99)
HBA1C MFR BLD: 5.9 % OF TOTAL HGB
HCT VFR BLD AUTO: 39.8 % (ref 38.5–50)
HDLC SERPL-MCNC: 47 MG/DL
HGB BLD-MCNC: 13.2 G/DL (ref 13.2–17.1)
LDLC SERPL CALC-MCNC: 155 MG/DL (CALC)
LYMPHOCYTES # BLD AUTO: 1606 CELLS/UL (ref 850–3900)
LYMPHOCYTES NFR BLD AUTO: 29.2 %
MCH RBC QN AUTO: 31.7 PG (ref 27–33)
MCHC RBC AUTO-ENTMCNC: 33.2 G/DL (ref 32–36)
MCV RBC AUTO: 95.4 FL (ref 80–100)
MONOCYTES # BLD AUTO: 457 CELLS/UL (ref 200–950)
MONOCYTES NFR BLD AUTO: 8.3 %
NEUTROPHILS # BLD AUTO: 3262 CELLS/UL (ref 1500–7800)
NEUTROPHILS NFR BLD AUTO: 59.3 %
NONHDLC SERPL-MCNC: 178 MG/DL (CALC)
PLATELET # BLD AUTO: 256 THOUSAND/UL (ref 140–400)
PMV BLD REES-ECKER: 9.1 FL (ref 7.5–12.5)
POTASSIUM SERPL-SCNC: 4.2 MMOL/L (ref 3.5–5.3)
PROT SERPL-MCNC: 6.9 G/DL (ref 6.1–8.1)
PSA SERPL-MCNC: 3.65 NG/ML
RBC # BLD AUTO: 4.17 MILLION/UL (ref 4.2–5.8)
SODIUM SERPL-SCNC: 140 MMOL/L (ref 135–146)
TRIGL SERPL-MCNC: 113 MG/DL
WBC # BLD AUTO: 5.5 THOUSAND/UL (ref 3.8–10.8)

## 2025-03-18 ENCOUNTER — TELEPHONE (OUTPATIENT)
Dept: PRIMARY CARE | Facility: CLINIC | Age: 75
End: 2025-03-18
Payer: MEDICARE

## 2025-03-18 NOTE — TELEPHONE ENCOUNTER
----- Message from Marco Antonio Suero sent at 3/17/2025  6:01 PM EDT -----  The labs reviewed were all normal, except the cholesterol was elevated, we recommend less beef, less fried foods,, and more fruits and vegetables and fish in the diet, recheck in 6 months,, sugars were slightly elevated 5.9 he just needs to watch sugary drinks in his diet stick with 0 sugar drinks would makes a big difference see his back in 4 to 6 months

## 2025-04-03 DIAGNOSIS — M17.0 BILATERAL PRIMARY OSTEOARTHRITIS OF KNEE: Primary | ICD-10-CM

## 2025-04-04 ENCOUNTER — HOSPITAL ENCOUNTER (OUTPATIENT)
Dept: RADIOLOGY | Facility: CLINIC | Age: 75
Discharge: HOME | End: 2025-04-04
Payer: MEDICARE

## 2025-04-04 ENCOUNTER — OFFICE VISIT (OUTPATIENT)
Dept: ORTHOPEDIC SURGERY | Facility: CLINIC | Age: 75
End: 2025-04-04
Payer: MEDICARE

## 2025-04-04 DIAGNOSIS — M17.0 BILATERAL PRIMARY OSTEOARTHRITIS OF KNEE: ICD-10-CM

## 2025-04-04 DIAGNOSIS — M17.0 BILATERAL PRIMARY OSTEOARTHRITIS OF KNEE: Primary | ICD-10-CM

## 2025-04-04 PROCEDURE — 1036F TOBACCO NON-USER: CPT | Performed by: ORTHOPAEDIC SURGERY

## 2025-04-04 PROCEDURE — 1123F ACP DISCUSS/DSCN MKR DOCD: CPT | Performed by: ORTHOPAEDIC SURGERY

## 2025-04-04 PROCEDURE — 99213 OFFICE O/P EST LOW 20 MIN: CPT | Performed by: ORTHOPAEDIC SURGERY

## 2025-04-04 PROCEDURE — 73564 X-RAY EXAM KNEE 4 OR MORE: CPT | Mod: 50

## 2025-04-04 PROCEDURE — 1159F MED LIST DOCD IN RCRD: CPT | Performed by: ORTHOPAEDIC SURGERY

## 2025-04-04 NOTE — PROGRESS NOTES
History of Present Illness  Chief Complaint   Patient presents with    Left Knee - Follow-up     S/P B/L Euflexxa 10/4/24  B/L xrays  today    Right Knee - Follow-up       Patient with known osteoarthritis of the side: bilateral knee who presents today for repeat evaluation.  The patient notes stable knee pain.  The patient notes stable mechanical symptoms.  The patient has tried the following modalities Rest, ice, elevation, Tylenol, NSAIDS, and Injections.      He notes minimal relief last round of viscosupplementation injections.  His family is pushing him to have his knees replaced.  However he is in the process of moving and selling his home and he plans to do so within the next 6 months.  He has taken ibuprofen 800 mg in the past with relief.  Past Medical History:   Diagnosis Date    Allergic rhinitis due to pollen     Hay fever    Anemia     COVID-19     VACCINATED    Deviated nasal septum     Hearing aid worn     Osteoarthritis     Personal history of other diseases of the respiratory system     Personal history of asthma    PONV (postoperative nausea and vomiting)     PATIENT STATED HAS LOW BLOOD PRESSURE IN THE PAST AFTER ANESTH.    Wears glasses        Medication Documentation Review Audit       Reviewed by Janice Browne on 04/04/25 at 0943      Medication Order Taking? Sig Documenting Provider Last Dose Status            No Medications to Display                                   Allergies   Allergen Reactions    House Dust Other     SNEEZING    Penicillins Dizziness       Social History     Socioeconomic History    Marital status:      Spouse name: Not on file    Number of children: Not on file    Years of education: Not on file    Highest education level: Not on file   Occupational History    Not on file   Tobacco Use    Smoking status: Never    Smokeless tobacco: Never   Vaping Use    Vaping status: Never Used   Substance and Sexual Activity    Alcohol use: Not Currently     Comment:  RARELY    Drug use: Defer    Sexual activity: Yes     Partners: Female   Other Topics Concern    Not on file   Social History Narrative    Not on file     Social Drivers of Health     Financial Resource Strain: Not on file   Food Insecurity: Not on file   Transportation Needs: Not on file   Physical Activity: Not on file   Stress: Not on file   Social Connections: Not on file   Intimate Partner Violence: Not on file   Housing Stability: Not on file       Past Surgical History:   Procedure Laterality Date    ROTATOR CUFF REPAIR Right     SEPTOPLASTY      DEVIATED SEPTAL NASAL REPAIR    TONSILLECTOMY              Review of Systems   GENERAL: Negative for malaise, significant weight loss, fever  MUSCULOSKELETAL: see HPI  NEURO:  Negative    BMI  29    Exam  side: bilateral Knee:  Skin healthy and intact  No gross swelling or ecchymosis  Alignment: moderate varus  Correctable: full  Effusion: mild  ROM: normal  Crepitance with range of motion  No pain with internal rotation of the hip  Tenderness to palpation: medial Pain with patellar compression: No  No laxity to valgus stress  No laxity to varus stress  Negative Lachman´s test  Negative posterior drawer test  negative Reanna´s test     Neurovascular exam normal distally  2+ DP pulse and good cap refill     Imaging  XR knee 4+ views bilateral  Interpreted By:  Allen Almanza,   STUDY:  XR KNEE 4+ VIEWS BILATERAL; ; 4/4/2025 9:42 am      INDICATION:  Signs/Symptoms:pain.      ACCESSION NUMBER(S):  [id number]      ORDERING CLINICIAN:  ALLEN ALMANZA      FINDINGS:  Bilateral knee films are negative for fracture, dislocation or  destructive lesion. There is severe tricompartmental arthrosis of  both knees with involvement of the medial compartment no  significantly. There is mild progression from prior films.          Signed by: Allen Almanza 4/4/2025 9:48 AM  Dictation workstation:   [id number]         Assessment  Patient with known osteoarthritis of the side: bilateral  knee left greater than right     Plan  We reviewed an evidence-based approach to OA of the knee.  We discussed past treatments as well options for future treatment.  Continue with ibuprofen 800 as needed while sells his house.  If the ibuprofen does not provide enough relief he may call the office in the future for prescription for Mobic  Follow-up in August or September 2025 to discuss total knee replacement in the fall winter 2025 we would proceed with the left knee first.  All questions answered

## 2025-06-04 NOTE — PROGRESS NOTES
History of Present Illness  No chief complaint on file.      Patient with known osteoarthritis of the side: bilateral knee who presents today for repeat evaluation.  The patient notes stable knee pain.  The patient notes worsening mechanical symptoms.  The patient has tried the following modalities NSAIDs and injections.      Past Medical History:   Diagnosis Date    Allergic rhinitis due to pollen     Hay fever    Anemia     COVID-19     VACCINATED    Deviated nasal septum     Hearing aid worn     Osteoarthritis     Personal history of other diseases of the respiratory system     Personal history of asthma    PONV (postoperative nausea and vomiting)     PATIENT STATED HAS LOW BLOOD PRESSURE IN THE PAST AFTER ANESTH.    Wears glasses        Medication Documentation Review Audit       Reviewed by Debbie Baca MA (Medical Assistant) on 08/08/24 at 0852      Medication Order Taking? Sig Documenting Provider Last Dose Status   celecoxib (CeleBREX) 100 mg capsule [id number]  Take 1 capsule (100 mg) by mouth once daily. Bhupendra Yee, DO  Active                    Allergies   Allergen Reactions    House Dust Other     SNEEZING    Penicillins Dizziness       Social History     Socioeconomic History    Marital status:      Spouse name: Not on file    Number of children: Not on file    Years of education: Not on file    Highest education level: Not on file   Occupational History    Not on file   Tobacco Use    Smoking status: Never    Smokeless tobacco: Never   Vaping Use    Vaping status: Never Used   Substance and Sexual Activity    Alcohol use: Not Currently     Comment: RARELY    Drug use: Defer    Sexual activity: Yes     Partners: Female   Other Topics Concern    Not on file   Social History Narrative    Not on file     Social Determinants of Health     Financial Resource Strain: Not on file   Food Insecurity: Not on file   Transportation Needs: Not on file   Physical Activity: Not on file   Stress: Not  on file   Social Connections: Not on file   Intimate Partner Violence: Not on file   Housing Stability: Not on file       Past Surgical History:   Procedure Laterality Date    SEPTOPLASTY      DEVIATED SEPTAL NASAL REPAIR    TONSILLECTOMY              Review of Systems   GENERAL: Negative for malaise, significant weight loss, fever  MUSCULOSKELETAL: see HPI  NEURO:  Negative    BMI  28    Exam  side: bilateral Knee:  Skin healthy and intact  No gross swelling or ecchymosis  Alignment: mild varus  Correctable: full  Effusion: moderate on the right, mild on the left  ROM:  0-110 degrees on the right, 0-125 degrees on the left  Crepitance with range of motion  No pain with internal rotation of the hip  Tenderness to palpation: medial, lateral Pain with patellar compression: Yes  No laxity to valgus stress  No laxity to varus stress  Negative Lachman´s test  Negative posterior drawer test  negative Reanna´s test     Neurovascular exam normal distally  2+ DP pulse and good cap refill     Imaging  XR knee 4+ views bilateral  Interpreted By:  Allen Almanza,   STUDY:  XR KNEE 4+ VIEWS BILATERAL; ; 7/18/2024 10:47 am      INDICATION:  Signs/Symptoms:pain.      ACCESSION NUMBER(S):  [id number]      ORDERING CLINICIAN:  ALLEN ALMANZA      FINDINGS:  Bilateral knee films are negative for fracture, dislocation or  destructive lesion. There is severe tricompartmental arthrosis of the  left knee with loss of joint space and spurring. There is significant  involvement of the medial compartment. There is an osteochondral  defect seen in the medial femoral condyle. The right knee shows  moderate tricompartmental degenerative change with joint space  narrowing and spurring.          Signed by: Allen Almanza 7/18/2024 12:03 PM  Dictation workstation:   [id number]         Assessment  Patient with known osteoarthritis of the side: bilateral knee     Plan  We reviewed an evidence-based approach to OA of the knee.  We discussed past  treatments as well options for future treatment.  Celebrex was ineffective so we will switch him to Motrin 800 mg 3 times daily  I will submit to his insurance company for viscoelastic supplement injection to both knees  Follow-up upon approval  All questions answered

## (undated) DEVICE — BLADE, STRYKER, 3.5MM, AGGRESSIVE PLUS

## (undated) DEVICE — GOWN, SURGICAL, ROYAL SILK, XL, STERILE

## (undated) DEVICE — TUBING, PATIENT 8FT STERILE

## (undated) DEVICE — GOWN, SURGICAL, ROYAL SILK, LG, STERILE

## (undated) DEVICE — DRAPE, SHEET, XL

## (undated) DEVICE — MAT, FLOOR, STANDARD FLUID BARRIER, 32X44, GREEN

## (undated) DEVICE — CANNULA, ARTHROSCOPY, UNIVERSAL, 5.5 X 70 MM

## (undated) DEVICE — GLOVE, SURGICAL, PROTEXIS PI BLUE W/NEUTHERA, 7.5, PF, LF

## (undated) DEVICE — DRAPE, SHOULDER, FLUID CONTROL, W/POUCH

## (undated) DEVICE — SUTURE, MONOCRYL, 4-0, 27 IN, PS-2, UNDYED

## (undated) DEVICE — POSITIONER, CRADLE, HEAD, MEDC, FOAM SLOTTED

## (undated) DEVICE — Device

## (undated) DEVICE — TUBING, SUCTION, 6MM X 10, CLEAN N-COND

## (undated) DEVICE — APPLICATOR, CHLORAPREP, W/ORANGE TINT, 26ML

## (undated) DEVICE — DRESSING, ABDOMINAL PAD, CURITY, 7.5 X 8 IN

## (undated) DEVICE — DRAPE, SHEET, U, W/ADHESIVE STRIP, IMPERVIOUS, 60 X 70 IN, DISPOSABLE, LF, STERILE

## (undated) DEVICE — TOWEL PACK 10-PK

## (undated) DEVICE — SYRINGE, 30 CC, LUER LOCK

## (undated) DEVICE — GLOVE, SURGICAL, PROTEXIS PI , 8.5, PF, LF

## (undated) DEVICE — GOWN, SURGICAL, ROYAL SILK, XXL, STERILE

## (undated) DEVICE — STRAP, ARM BOARD, 32 X 1.5

## (undated) DEVICE — COVER, MAYO STAND, W/PAD, 23 IN, DISPOSABLE, PLASTIC, LF, STERILE

## (undated) DEVICE — HOLSTER, ELECTROSURGERY ACCESSORY, STERILE

## (undated) DEVICE — CANNULA, ARTHROSCOPIC TWIST-IN 7MM

## (undated) DEVICE — TIP, SUCTION, YANKAUER, FLEXIBLE

## (undated) DEVICE — SUTURE SYSTEM, EXPRESSEW III NEEDLES

## (undated) DEVICE — SUTURE, ETHILON, 3-0, 18 IN, PS1, BLACK

## (undated) DEVICE — PROTECTOR, NERVE, ULNAR, PINK

## (undated) DEVICE — NEEDLE, SCORPION, HD

## (undated) DEVICE — STRAP, VELCRO, BODY, 4 X 60IN, NS

## (undated) DEVICE — DRAPE, SHEET, 17 X 23 IN

## (undated) DEVICE — MANIFOLD, 4 PORT NEPTUNE STANDARD

## (undated) DEVICE — DRAPE, SHEET, U, STERI DRAPE, 47 X 51 IN, DISPOSABLE, STERILE

## (undated) DEVICE — BLADE, STRYKER, 4.0MM, RESECTOR, F-SERIES

## (undated) DEVICE — STOCKINETTE, IMPERVIOUS, LARGE, 9IN X 48IN

## (undated) DEVICE — BURR, STRYKER, 4.0MM, BRL 6FLT

## (undated) DEVICE — DRESSING, GAUZE, PETROLATUM, STRIP, XEROFORM, 1 X 8 IN, STERILE

## (undated) DEVICE — CANNULA, GEMINI SR8

## (undated) DEVICE — PROBE, CRUISE ENERGY, ARTHOSCOPIC SERFAS 90-S 4.0MM

## (undated) DEVICE — GLOVE, SURGICAL, PROTEXIS PI , 7.5, PF, LF

## (undated) DEVICE — TAPE, SURGICAL, FOAM, MICROFOAM, HYPOALLERGENIC, 4 IN X 5.5 YD